# Patient Record
Sex: FEMALE | Race: WHITE | NOT HISPANIC OR LATINO | Employment: FULL TIME | URBAN - METROPOLITAN AREA
[De-identification: names, ages, dates, MRNs, and addresses within clinical notes are randomized per-mention and may not be internally consistent; named-entity substitution may affect disease eponyms.]

---

## 2017-04-03 ENCOUNTER — ALLSCRIPTS OFFICE VISIT (OUTPATIENT)
Dept: OTHER | Facility: OTHER | Age: 47
End: 2017-04-03

## 2017-04-03 DIAGNOSIS — N92.6 IRREGULAR MENSTRUATION: ICD-10-CM

## 2017-04-03 DIAGNOSIS — Z12.31 ENCOUNTER FOR SCREENING MAMMOGRAM FOR MALIGNANT NEOPLASM OF BREAST: ICD-10-CM

## 2017-04-03 PROCEDURE — G0145 SCR C/V CYTO,THINLAYER,RESCR: HCPCS | Performed by: OBSTETRICS & GYNECOLOGY

## 2017-04-04 ENCOUNTER — LAB REQUISITION (OUTPATIENT)
Dept: LAB | Facility: HOSPITAL | Age: 47
End: 2017-04-04
Payer: COMMERCIAL

## 2017-04-04 DIAGNOSIS — Z01.419 ENCOUNTER FOR GYNECOLOGICAL EXAMINATION WITHOUT ABNORMAL FINDING: ICD-10-CM

## 2017-04-10 LAB
LAB AP GYN PRIMARY INTERPRETATION: NORMAL
Lab: NORMAL

## 2017-04-11 ENCOUNTER — HOSPITAL ENCOUNTER (OUTPATIENT)
Dept: ULTRASOUND IMAGING | Facility: MEDICAL CENTER | Age: 47
Discharge: HOME | End: 2017-04-11
Payer: COMMERCIAL

## 2017-04-11 ENCOUNTER — GENERIC CONVERSION - ENCOUNTER (OUTPATIENT)
Dept: OTHER | Facility: OTHER | Age: 47
End: 2017-04-11

## 2017-04-11 DIAGNOSIS — N92.6 IRREGULAR MENSTRUATION: ICD-10-CM

## 2017-04-11 PROCEDURE — 76830 TRANSVAGINAL US NON-OB: CPT

## 2017-04-11 PROCEDURE — 76856 US EXAM PELVIC COMPLETE: CPT

## 2017-04-14 ENCOUNTER — GENERIC CONVERSION - ENCOUNTER (OUTPATIENT)
Dept: OTHER | Facility: OTHER | Age: 47
End: 2017-04-14

## 2017-05-08 ENCOUNTER — HOSPITAL ENCOUNTER (OUTPATIENT)
Dept: MAMMOGRAPHY | Facility: MEDICAL CENTER | Age: 47
Discharge: HOME | End: 2017-05-08
Payer: COMMERCIAL

## 2017-05-08 DIAGNOSIS — Z12.31 ENCOUNTER FOR SCREENING MAMMOGRAM FOR MALIGNANT NEOPLASM OF BREAST: ICD-10-CM

## 2017-05-08 PROCEDURE — G0202 SCR MAMMO BI INCL CAD: HCPCS

## 2017-05-11 ENCOUNTER — GENERIC CONVERSION - ENCOUNTER (OUTPATIENT)
Dept: OTHER | Facility: OTHER | Age: 47
End: 2017-05-11

## 2017-05-11 DIAGNOSIS — R92.8 OTHER ABNORMAL AND INCONCLUSIVE FINDINGS ON DIAGNOSTIC IMAGING OF BREAST: ICD-10-CM

## 2017-05-12 ENCOUNTER — HOSPITAL ENCOUNTER (OUTPATIENT)
Dept: MAMMOGRAPHY | Facility: CLINIC | Age: 47
Discharge: HOME | End: 2017-05-12
Payer: COMMERCIAL

## 2017-05-12 ENCOUNTER — HOSPITAL ENCOUNTER (OUTPATIENT)
Dept: ULTRASOUND IMAGING | Facility: CLINIC | Age: 47
Discharge: HOME | End: 2017-05-12
Payer: COMMERCIAL

## 2017-05-12 ENCOUNTER — ALLSCRIPTS OFFICE VISIT (OUTPATIENT)
Dept: OTHER | Facility: OTHER | Age: 47
End: 2017-05-12

## 2017-05-12 DIAGNOSIS — R92.8 ABNORMAL MAMMOGRAM: ICD-10-CM

## 2017-05-12 DIAGNOSIS — R92.8 OTHER ABNORMAL AND INCONCLUSIVE FINDINGS ON DIAGNOSTIC IMAGING OF BREAST: ICD-10-CM

## 2017-05-12 PROCEDURE — 76642 ULTRASOUND BREAST LIMITED: CPT

## 2017-05-12 PROCEDURE — G0206 DX MAMMO INCL CAD UNI: HCPCS

## 2017-11-28 ENCOUNTER — ALLSCRIPTS OFFICE VISIT (OUTPATIENT)
Dept: OTHER | Facility: OTHER | Age: 47
End: 2017-11-28

## 2017-11-29 NOTE — PROGRESS NOTES
Assessment    1  Recurrent candidiasis of vagina (112 1) (B37 3)    Plan  Recurrent candidiasis of vagina    · Fluconazole 150 MG Oral Tablet; Take 1 tab po q 72 hours x3 doses then q weekuntil finished   Rx By: Phoebe Duty; Dispense: 20 Days ; #:20 Tablet; Refill: 0;Recurrent candidiasis of vagina; NICOLE = N; Verified Transmission to [id number] Last Updated By: System, Lover.ly; 11/28/2017 2:38:09 PM    Discussion/Summary  Discussion Summary:   Discussed options of treating for longer period of time, as she did in the past  Patient amendable to this  We will treat her for a yeast infection with fluconazole every 3 days for 3 doses then followed by weekly for 3 months  Counseling Documentation With Imm: The patient was counseled regarding  total time of encounter was minutes  Chief Complaint  Chief Complaint Free Text Note Form: Pt present s for yeast infection      History of Present Illness  HPI: Patient presents with complaints of a recurrent yeast infections  She states the symptoms started approximately 1 week ago with burning, itching and discharge  She states she used over-the-counter cream for 3 days which help alleviated some of her symptoms but she feels like her infection is still present  She notices her symptoms start 5 days after intercourse  She states it happens every time she has intercourse  She in the past she has been treated with Diflucan sex successfully  If she does not get treatment her symptoms linger and persist until she has Diflucan  She states it is the 3rd time this has happened in 2017  But the symptoms have been had occurring for the past 2 years  This happened multiple years ago where the patient had a take a long course of Diflucan to alleviate her symptoms        Review of Systems   Female ROS: vaginal discharge-- and-- vaginal itching, but-- no pelvic pain,-- no pelvic pressure,-- no vaginal pain,-- no vaginal lump or mass,-- no vaginal odor,-- no vulvar pain,-- no vulvar itching-- and-- no vulvar lump or mass  Focused-Female:  Gastrointestinal: as noted in HPI  Active Problems    1  Abnormal finding on mammography (793 80) (R92 8)   2  Breast pain (611 71) (N64 4)   3  Encounter for routine gynecological examination (V72 31) (Z01 419)   4  Encounter for screening mammogram for malignant neoplasm of breast (V76 12) (Z12 31)   5  Female pelvic pain (625 9) (R10 2)   6  Idiopathic insomnia (307 42) (F51 01)   7  Irregular bleeding (626 4) (N92 6)   8  Screening for HPV (human papillomavirus) (V73 81) (Z11 51)    Past Medical History    1  History of Asthma (493 90) (J45 909)   2  History of Gastroparesis (536 3) (K31 84)   3  History of candidiasis (V12 09) (Z86 19)    Surgical History  1  History of Appendectomy   2  History of Biopsy Breast Open   3  History of Hysteroscopy W/ Insert Of Device To Occlude Fallopian Tubes   4  History of Vaginal Sling Operation For Stress Incontinence    Family History  Paternal Grandmother    1  Family history of Breast Cancer (V16 3)    Social History     · Being A Social Drinker   · Never A Smoker    Current Meds   1  ClonazePAM 1 MG Oral Tablet; TAKE 1 TABLET AT BEDTIME; Therapy: 51Nak3355 to (Last Rx:47Cap2332) Ordered   2  Gabapentin 300 MG Oral Capsule; Take two capsules one hour before bedtime; Therapy: 28GKQ7402 to (Evaluate:21Apr2014)  Requested for: 98Swf6802; Last Rx:46Dwv5559 Ordered   3  Protonix 20 MG Oral Tablet Delayed Release Recorded   4  Zolpidem Tartrate ER 12 5 MG Oral Tablet Extended Release; TAKE 1 TABLET AT BEDTIME; Therapy: 86Liy5298 to (Last Rx:88Uzm7535) Ordered    Allergies  1  Cipro TABS   2  Erythromycin Base TABS   3  Iodine SOLN   4  NuPrep 5% Povidone-Iodine SOLN   5  Penicillins   6   Vicodin TABS  7  IV Dye    Vitals  Vital Signs    Recorded: 98YQQ1697 20:83XV   Systolic 415   Diastolic 78   Weight 581 lb    BMI Calculated 30 21   BSA Calculated 1 85       Physical Exam   Constitutional General appearance: No acute distress, well appearing and well nourished  Pulmonary  Respiratory effort: No increased work of breathing or signs of respiratory distress  Auscultation of lungs: Clear to auscultation  Cardiovascular  Auscultation of heart: Normal rate and rhythm, normal S1 and S2, no murmurs  Peripheral vascular exam: Normal pulses Throughout  Genitourinary  External genitalia: Normal and no lesions appreciated  Vagina: Abnormal   Vagina: moderate,-- white-- and-- cheesy vaginal discharge  Urethra: Normal        Health Management  Encounter for routine gynecological examination   BREAST EXAM; every 1 year; Next Due: 01Ckb7263; Overdue  PELVIC EXAM; every 1 year; Next Due: 05Wws0637;  Overdue    Signatures   Electronically signed by : Ambrose Schlatter, M D ; Nov 28 2017  2:40PM EST                       (Author)

## 2018-01-11 NOTE — RESULT NOTES
Verified Results  * US PELVIS COMPLETE (TRANSABDOMINAL AND TRANSVAGINAL) [id number] 01:32PM John Muhammad Order Number: [id number]    - Patient Instructions: To schedule this appointment, please contact Central Scheduling at [phone]  Test Name Result Flag Reference   US PELVIS COMPLETE (TRANSABDOMINAL AND TRANSVAGINAL) (Report)     PELVIC ULTRASOUND, COMPLETE     INDICATION: Irregular menstruation [N92 6 (ICD-10-CM)] LMP was couple of years ago  COMPARISON: CT 4/8/2010  TECHNIQUE:  Transabdominal pelvic ultrasound was performed in sagittal and transverse planes with a curvilinear transducer  Additional transvaginal imaging was performed to better evaluate the endometrium and ovaries  Imaging included volumetric    sweeps as well as traditional still imaging technique  FINDINGS:     UTERUS:   The uterus is anteverted in position, measuring 6 8 x 2 7 x 5 7 cm     2 discrete intramural hypoechoic nodules are seen, measuring 1 4 x 0 9 x 1 3 cm and 1 2 x 0 8 x 1 4 cm, likely representing fibroids  The cervix shows no suspicious abnormality  ENDOMETRIUM:    Caliber of 8 mm  Homogenous and normal in appearance  OVARIES/ADNEXA:   Right ovary: 4 3 x 2 8 x 3 0 cm  No suspicious right ovarian abnormality  Typical corpus luteum noted  There is also a 2 8 x 1 8 x 1 8 cm cyst with thin septation  Doppler flow within normal limits  Left ovary: 1 2 x 2 1 x 1 5 cm  No suspicious left ovarian abnormality  Doppler flow within normal limits  No suspicious adnexal mass or loculated collections  Trace free fluid is present  IMPRESSION:        1  Intramural fibroids  2  Otherwise simple cyst with single thin septation within the right ovary  Corpus luteum cyst also noted          Workstation performed: [id number]     Signed by:   aMrlen Hankins MD   4/13/17

## 2018-01-11 NOTE — RESULT NOTES
Verified Results  * MAMMO SCREENING BILATERAL W CAD [id number] 01:40PM Khadra Deluna Order Number: [id number]    - Patient Instructions: To schedule this appointment, please contact Central Scheduling at [phone]  Do not wear any perfume, powder, lotion or deodorant on breast or underarm area  Please bring your doctors order, referral (if needed) and insurance information with you on the day of the test  Failure to bring this information may result in this test being rescheduled  Arrive 15 minutes prior to your appointment time to register  On the day of your test, please bring any prior mammogram or breast studies with you that were not performed at a [facility]  Failure to bring prior exams may result in your test needing to be rescheduled  Test Name Result Flag Reference   MAMMO SCREENING BILATERAL W CAD (Report)     Patient History:   Patient is postmenopausal and had first child at age 29  Family history of breast cancer under age 48 in paternal    grandmother  Benign breast guidance of the right breast, October 26, 2012  Pathology Report of both breasts, October 26, 2012  Benign    excisional biopsy of the right breast    No Hormone Replacement Therapy   Patient has never smoked  Patient's BMI is 29 5  Reason for exam: screening, asymptomatic  Screening     Mammo Screening Bilateral W CAD: May 8, 2017 - Check In #:    [de-identified]   Bilateral CC and MLO view(s) were taken  Technologist: Petrona North, RT(R)(M)   Prior study comparison: March 21, 2016, mammo screening bilateral   W CAD performed at [place] [address]  January 22, 2015, bilateral digital screening mammogram performed   at [place] [address]  December 23, 2013, bilateral digital screening mammogram performed at [place] [address]   October 26, 2012, right    breast unilateral diagnostic mammogram, performed at [place] [address] [address]  October 26, 2012, bilateral WB digitl    bilat charles, performed at [address] [place] [address]  October 20, 2011, bilateral digital screening mammogram performed   at [facility]  There are scattered fibroglandular densities  No dominant soft    tissue mass, architectural distortion or suspicious    calcifications are noted in either breast  The skin and nipple    contours are within normal limits  Density behind the right nipple towards the 6 o'clock position    seems slightly more prominent than on previous exam  Recommend    spot compression imaging  Ultrasound may also be warranted  Needs additional imaging  ACR BI-RADSï¾® Assessments: BiRad:0 - Incomplete: needs additional    imaging evaluation - Right     Recommendation:   Further imaging of the right breast    A breast health care nurse from our facility will be contacting    the patient regarding the need for additional imaging  Analyzed by CAD     8-10% of cancers will be missed on mammography  Management of a    palpable abnormality must be based on clinical grounds  Patients   will be notified of their results via letter from our facility  Accredited by [employer] of Radiology and FDA  Transcription Location: [facility]: [id number]     Risk Value(s):   Tyrer-Cuzick 10 Year: 3 400%, Tyrer-Cuzick Lifetime: 17 900%,    Myriad Table: 2 6%, SAMPSON 5 Year: 3 2%, NCI Lifetime: 21 2%   Signed by:   Arti Hay MD   5/8/17       Plan  Abnormal finding on mammography    · *US BREAST RIGHT LIMITED (DIAGNOSTIC);  Status:Hold For - Scheduling; Requested  for:78Umx7090;    · MAMMO DIAGNOSTIC RIGHT W CAD; Status:Hold For - Scheduling; Requested  for:12Tvr7340;

## 2018-01-11 NOTE — RESULT NOTES
Verified Results  (LC) PapIG, HPV, rfx 16/18 52Yho9507 12:00AM Nasrin Paige   No  of containers  Rosella Goldmann 01 CYTYC Thin Prep Vial     Test Name Result Flag Reference   DIAGNOSIS: Comment     NEGATIVE FOR INTRAEPITHELIAL LESION AND MALIGNANCY  THE CYTOLOGY PROCESSING WAS PERFORMED AT THE [facility] LOCATED AT  [facility], [address] [place] [address], [address] [place] [phone]  Specimen adequacy: Comment     Satisfactory for evaluation  Endocervical and/or squamous metaplastic  cells (endocervical component) are present  Clinician provided ICD10: Comment     Z11 51  Z01 419   Performed by: Lawrence French Cytotechnologist (ASCP)     Rosella Goldmann Note: Comment     The Pap smear is a screening test designed to aid in the detection of  premalignant and malignant conditions of the uterine cervix  It is not a  diagnostic procedure and should not be used as the sole means of detecting  cervical cancer  Both false-positive and false-negative reports do occur  Test Methodology: Comment     This liquid based ThinPrep(R) pap test was screened with the  use of an image guided system  HPV, high-risk Negative  Negative   This high-risk HPV test detects thirteen high-risk types  (16/18/31/33/35/39/45/51/52/56/58/59/68) without differentiation

## 2018-01-13 NOTE — RESULT NOTES
Verified Results  (1) THIN PREP PAP WITH IMAGING 03Apr2017 12:00AM Bautista Dec     Test Name Result Flag Reference   LAB AP CASE REPORT (Report)     Gynecologic Cytology Report            Case: [id number]                  Authorizing Provider: Fernando Simpson MD     Collected:      04/03/2017           First Screen:     SANDY Maldonado Received:      04/04/2017 1448        Rescreen:       SANDY Key                             Specimen:  LIQUID-BASED PAP, SCREENING, Endocervical   LAB AP GYN PRIMARY INTERPRETATION      Negative for intraepithelial lesion or malignancy  Electronically signed by SANDY Key on 4/10/2017 at 11:10 AM   LAB AP GYN SPECIMEN ADEQUACY      Satisfactory for evaluation  Endocervical/transformation zone component present  LAB AP GYN ADDITIONAL INFORMATION (Report)     Odimax's FDA approved ,  and ThinPrep Imaging System are   utilized with strict adherence to the 's instruction manual to   prepare gynecologic and non-gynecologic cytology specimens for the   production of ThinPrep slides as well as for gynecologic ThinPrep imaging  These processes have been validated by our laboratory and/or by the     The Pap test is not a diagnostic procedure and should not be used as the   sole means to detect cervical cancer  It is only a screening procedure to   aid in the detection of cervical cancer and its precursors  Both   false-negative and false-positive results have been experienced  Your   patient's test result should be interpreted in this context together with   the history and clinical findings

## 2018-01-14 VITALS
SYSTOLIC BLOOD PRESSURE: 124 MMHG | DIASTOLIC BLOOD PRESSURE: 74 MMHG | BODY MASS INDEX: 29.56 KG/M2 | WEIGHT: 172.19 LBS

## 2018-01-14 VITALS — WEIGHT: 176 LBS | SYSTOLIC BLOOD PRESSURE: 116 MMHG | BODY MASS INDEX: 30.21 KG/M2 | DIASTOLIC BLOOD PRESSURE: 78 MMHG

## 2018-01-14 VITALS — BODY MASS INDEX: 30.04 KG/M2 | WEIGHT: 175 LBS | DIASTOLIC BLOOD PRESSURE: 80 MMHG | SYSTOLIC BLOOD PRESSURE: 126 MMHG

## 2018-09-07 DIAGNOSIS — Z12.39 SCREENING FOR MALIGNANT NEOPLASM OF BREAST: Primary | ICD-10-CM

## 2018-10-01 ENCOUNTER — HOSPITAL ENCOUNTER (OUTPATIENT)
Dept: MAMMOGRAPHY | Facility: MEDICAL CENTER | Age: 48
Discharge: HOME | End: 2018-10-01
Payer: COMMERCIAL

## 2018-10-01 DIAGNOSIS — Z12.39 SCREENING FOR MALIGNANT NEOPLASM OF BREAST: ICD-10-CM

## 2018-10-01 PROCEDURE — 77067 SCR MAMMO BI INCL CAD: CPT

## 2018-10-01 PROCEDURE — 77063 BREAST TOMOSYNTHESIS BI: CPT

## 2018-10-02 ENCOUNTER — ANNUAL EXAM (OUTPATIENT)
Dept: OBGYN CLINIC | Facility: MEDICAL CENTER | Age: 48
End: 2018-10-02
Payer: COMMERCIAL

## 2018-10-02 VITALS — DIASTOLIC BLOOD PRESSURE: 76 MMHG | WEIGHT: 169 LBS | SYSTOLIC BLOOD PRESSURE: 124 MMHG | BODY MASS INDEX: 29.01 KG/M2

## 2018-10-02 DIAGNOSIS — Z12.39 SCREENING FOR MALIGNANT NEOPLASM OF BREAST: ICD-10-CM

## 2018-10-02 DIAGNOSIS — Z01.419 ENCOUNTER FOR WELL WOMAN EXAM WITH ROUTINE GYNECOLOGICAL EXAM: Primary | ICD-10-CM

## 2018-10-02 PROCEDURE — 99396 PREV VISIT EST AGE 40-64: CPT | Performed by: OBSTETRICS & GYNECOLOGY

## 2018-10-02 RX ORDER — ZOLPIDEM TARTRATE 12.5 MG/1
TABLET, FILM COATED, EXTENDED RELEASE ORAL
COMMUNITY
Start: 2018-07-21

## 2018-10-02 RX ORDER — CLONAZEPAM 2 MG/1
TABLET ORAL
Refills: 0 | COMMUNITY
Start: 2018-07-05

## 2018-10-02 RX ORDER — PANTOPRAZOLE SODIUM 40 MG/1
TABLET, DELAYED RELEASE ORAL
COMMUNITY
Start: 2018-06-11

## 2018-10-02 RX ORDER — GABAPENTIN 300 MG/1
CAPSULE ORAL
COMMUNITY
Start: 2018-06-11

## 2018-10-02 NOTE — PROGRESS NOTES
ASSESSMENT & PLAN: Cathryn Murry is a 53 y o  No obstetric history on file  with normal gynecologic exam     1   Routine well woman exam done today  2  Pap and HPV:  The patient's last pap and hpv was 2016, pap 2017  It was normal     Pap and cotesting was done today  Current ASCCP Guidelines reviewed  Per patient's request  3  Mammogram ordered  4  The following were reviewed in today's visit: breast self exam and mammography screening ordered      CC:  Annual Gynecologic Examination    HPI: Cathryn Murry is a 53 y o  No obstetric history on file  who presents for annual gynecologic examination  She has the following concerns:  Reports urge incontinence, aware she waits too long between voids; still bleeding monthly with cramps, light period    Health Maintenance:    She wears her seatbelt routinely  She does perform regular monthly self breast exams  She feels safe at home  There is no problem list on file for this patient  History reviewed  No pertinent past medical history  History reviewed  No pertinent surgical history  Past OB/Gyn History:  OB History     No data available           Pt does not have menstrual issues     History of sexually transmitted infection: No   History of abnormal pap smears: No      Patient is currently sexually active  heterosexual  The current method of family planning is tubal ligation  History reviewed  No pertinent family history  Social History:  Social History     Social History    Marital status:      Spouse name: N/A    Number of children: N/A    Years of education: N/A     Occupational History    Not on file       Social History Main Topics    Smoking status: Never Smoker    Smokeless tobacco: Never Used    Alcohol use No    Drug use: No    Sexual activity: Yes     Partners: Male     Other Topics Concern    Not on file     Social History Narrative    No narrative on file       Allergies   Allergen Reactions    Hydrocodone      headache    Ciprofloxacin     Codeine     Erythromycin     Iodine Other (See Comments)     dye-rash    Iv Dye  [Iodinated Diagnostic Agents]     Povidone Iodine        Current Outpatient Prescriptions:     clonazePAM (KlonoPIN) 2 mg tablet, TAKE 1 TABLET (2 MG TOTAL) BY MOUTH NIGHTLY , Disp: , Rfl: 0    gabapentin (NEURONTIN) 300 mg capsule, TAKE 2 CAPSULES (600 MG TOTAL) BY MOUTH NIGHTLY , Disp: , Rfl:     pantoprazole (PROTONIX) 40 mg tablet, TAKE 1 TABLET (40 MG TOTAL) BY MOUTH 2 (TWO) TIMES A DAY , Disp: , Rfl:     zolpidem (AMBIEN CR) 12 5 MG CR tablet, TAKE 1 TABLET BY MOUTH AT BEDTIME, Disp: , Rfl:     Review of Systems:    Review of Systems  Constitutional :no fever, feels well, no tiredness, no recent weight gain or loss  ENT: no ear ache, no loss of hearing, no nosebleeds or nasal discharge, no sore throat or hoarseness  Cardiovascular: no complaints of slow or fast heart beat, no chest pain, no palpitations, no leg claudication or lower extremity edema  Respiratory: no complaints of shortness of shortness of breath, no ELLER  Breasts:no complaints of breast pain, breast lump, or nipple discharge  Gastrointestinal: no complaints of abdominal pain, constipation, nausea, vomiting, or diarrhea or bloody stools  Genitourinary : no complaints of dysuria, incontinence, pelvic pain, no dysmenorrhea, vaginal discharge or abnormal vaginal bleeding and as noted in HPI  Musculoskeletal: no complaints of arthralgia, no myalgia, no joint swelling or stiffness, no limb pain or swelling    Integumentary: no complaints of skin rash or lesion, itching or dry skin  Neurological: no complaints of headache, no confusion, no numbness or tingling, no dizziness or fainting    Objective      /76   Wt 76 7 kg (169 lb)   LMP 10/02/2018   BMI 29 01 kg/m²     General:   appears stated age, cooperative, alert normal mood and affect   Neck: normal, supple,trachea midline, no masses   Heart: regular rate and rhythm, S1, S2 normal, no murmur, click, rub or gallop   Lungs: clear to auscultation bilaterally   Breasts: normal appearance, no masses or tenderness, Inspection negative, No nipple retraction or dimpling, No nipple discharge or bleeding, No axillary or supraclavicular adenopathy, Normal to palpation without dominant masses   Abdomen: soft, non-tender, without masses or organomegaly   Vulva: normal female genitalia, Bartholin's, Urethra, [place] normal, no lesions, normal female hair distribution   Vagina: normal vagina, no discharge, exudate, lesion, or erythema   Urethra: normal   Cervix: Normal, no discharge  PAP done  Uterus: normal size, contour, position, consistency, mobility, non-tender   Adnexa: normal adnexa   Lymphatic palpation of lymph nodes in neck, axilla, groin and/or other locations: no lymphadenopathy or masses noted   Skin normal skin turgor and no rashes     Psychiatric orientation to person, place, and time: normal  mood and affect: normal

## 2018-10-05 LAB
CYTOLOGIST CVX/VAG CYTO: NORMAL
DX ICD CODE: NORMAL
OTHER STN SPEC: NORMAL
OTHER STN SPEC: NORMAL
PATH REPORT.FINAL DX SPEC: NORMAL
SL AMB NOTE:: NORMAL
SL AMB SPECIMEN ADEQUACY: NORMAL
SL AMB TEST METHODOLOGY: NORMAL

## 2018-11-26 DIAGNOSIS — B37.3 VAGINAL YEAST INFECTION: Primary | ICD-10-CM

## 2018-11-26 RX ORDER — FLUCONAZOLE 150 MG/1
150 TABLET ORAL ONCE
Qty: 2 TABLET | Refills: 1 | Status: SHIPPED | OUTPATIENT
Start: 2018-11-26 | End: 2019-10-23 | Stop reason: SDUPTHER

## 2019-09-09 ENCOUNTER — TRANSCRIBE ORDERS (OUTPATIENT)
Dept: ADMINISTRATIVE | Facility: HOSPITAL | Age: 49
End: 2019-09-09

## 2019-09-09 DIAGNOSIS — Z12.39 BREAST SCREENING, UNSPECIFIED: Primary | ICD-10-CM

## 2019-09-09 DIAGNOSIS — Z12.39 BREAST SCREENING: ICD-10-CM

## 2019-10-23 DIAGNOSIS — B37.3 VAGINAL YEAST INFECTION: ICD-10-CM

## 2019-10-23 RX ORDER — FLUCONAZOLE 150 MG/1
150 TABLET ORAL ONCE
Qty: 2 TABLET | Refills: 1 | Status: SHIPPED | OUTPATIENT
Start: 2019-10-23 | End: 2019-10-23

## 2019-10-28 ENCOUNTER — HOSPITAL ENCOUNTER (OUTPATIENT)
Dept: MAMMOGRAPHY | Facility: MEDICAL CENTER | Age: 49
Discharge: HOME | End: 2019-10-28
Payer: COMMERCIAL

## 2019-10-28 VITALS — BODY MASS INDEX: 28.85 KG/M2 | HEIGHT: 64 IN | WEIGHT: 169 LBS

## 2019-10-28 DIAGNOSIS — Z12.39 BREAST SCREENING: ICD-10-CM

## 2019-10-28 PROCEDURE — 77063 BREAST TOMOSYNTHESIS BI: CPT

## 2019-10-28 PROCEDURE — 77067 SCR MAMMO BI INCL CAD: CPT

## 2019-12-15 DIAGNOSIS — B37.3 VAGINAL YEAST INFECTION: ICD-10-CM

## 2019-12-16 RX ORDER — FLUCONAZOLE 150 MG/1
150 TABLET ORAL ONCE
Qty: 2 TABLET | Refills: 1 | OUTPATIENT
Start: 2019-12-16 | End: 2019-12-16

## 2020-02-09 DIAGNOSIS — B37.3 VAGINAL YEAST INFECTION: ICD-10-CM

## 2020-02-10 RX ORDER — FLUCONAZOLE 150 MG/1
150 TABLET ORAL ONCE
Qty: 2 TABLET | Refills: 1 | OUTPATIENT
Start: 2020-02-10 | End: 2020-02-10

## 2020-02-13 ENCOUNTER — OFFICE VISIT (OUTPATIENT)
Dept: OBGYN CLINIC | Facility: CLINIC | Age: 50
End: 2020-02-13
Payer: COMMERCIAL

## 2020-02-13 VITALS — DIASTOLIC BLOOD PRESSURE: 100 MMHG | SYSTOLIC BLOOD PRESSURE: 156 MMHG | BODY MASS INDEX: 30.55 KG/M2 | WEIGHT: 178 LBS

## 2020-02-13 DIAGNOSIS — N90.89 VULVAR IRRITATION: Primary | ICD-10-CM

## 2020-02-13 DIAGNOSIS — B37.9 CANDIDA INFECTION: ICD-10-CM

## 2020-02-13 PROCEDURE — 99213 OFFICE O/P EST LOW 20 MIN: CPT | Performed by: OBSTETRICS & GYNECOLOGY

## 2020-02-13 RX ORDER — FLUCONAZOLE 150 MG/1
150 TABLET ORAL ONCE
Qty: 1 TABLET | Refills: 0 | Status: SHIPPED | OUTPATIENT
Start: 2020-02-13 | End: 2020-02-13

## 2020-02-13 RX ORDER — PAROXETINE HYDROCHLORIDE 20 MG/1
TABLET, FILM COATED ORAL
COMMUNITY
Start: 2019-11-18

## 2020-02-13 RX ORDER — PHENTERMINE HYDROCHLORIDE 37.5 MG/1
37.5 CAPSULE ORAL
COMMUNITY
Start: 2020-01-31 | End: 2020-03-01

## 2020-02-13 NOTE — PROGRESS NOTES
Chalino Downing was seen today for vaginal itching  Diagnoses and all orders for this visit:    Vulvar irritation    Candida infection  -     fluconazole (DIFLUCAN) 150 mg tablet; Take 1 tablet (150 mg total) by mouth once for 1 dose         Plan:  Patient reassured KOH and wet mount negative today  Vulvar irritation: supportive measures reviewed, encouraged lotrimin   Diflucan refill per patient request secondary to frequent yeast infection    Aarti Addison is a 51 y o  female here for a problem visit  Patient is complaining of irritation of her vulva and opening of vagina  Has tried the last week 3 diflucan and monistat with most recent diflucan being yesterday  States her irritation is mostly outside her vagina  denies any changes in habits   There is no problem list on file for this patient  Gynecologic History  Patient's last menstrual period was 10/02/2018        Past Medical History:   Diagnosis Date    Hx of skin cancer, basal cell      Past Surgical History:   Procedure Laterality Date    BREAST BIOPSY Right 10/26/2012    benign     Family History   Problem Relation Age of Onset    No Known Problems Mother     No Known Problems Father     No Known Problems Maternal Grandmother     No Known Problems Maternal Grandfather     Breast cancer Paternal Grandmother         age unknown    No Known Problems Paternal Grandfather     No Known Problems Maternal Aunt     Skin cancer Maternal Aunt         age unknown     Social History     Socioeconomic History    Marital status:      Spouse name: Not on file    Number of children: Not on file    Years of education: Not on file    Highest education level: Not on file   Occupational History    Not on file   Social Needs    Financial resource strain: Not on file    Food insecurity:     Worry: Not on file     Inability: Not on file    Transportation needs:     Medical: Not on file     Non-medical: Not on file   Tobacco Use    Smoking status: Never Smoker    Smokeless tobacco: Never Used   Substance and Sexual Activity    Alcohol use: No    Drug use: No    Sexual activity: Yes     Partners: Male   Lifestyle    Physical activity:     Days per week: Not on file     Minutes per session: Not on file    Stress: Not on file   Relationships    Social connections:     Talks on phone: Not on file     Gets together: Not on file     Attends [employer] service: Not on file     Active member of club or organization: Not on file     Attends meetings of clubs or organizations: Not on file     Relationship status: Not on file    Intimate partner violence:     Fear of current or ex partner: Not on file     Emotionally abused: Not on file     Physically abused: Not on file     Forced sexual activity: Not on file   Other Topics Concern    Not on file   Social History Narrative    Not on file     Allergies   Allergen Reactions    Hydrocodone      headache    Ciprofloxacin     Codeine     Erythromycin     Iodine Other (See Comments)     dye-rash    Iv Dye  [Iodinated Diagnostic Agents]     Povidone Iodine        Current Outpatient Medications:     clonazePAM (KlonoPIN) 2 mg tablet, TAKE 1 TABLET (2 MG TOTAL) BY MOUTH NIGHTLY , Disp: , Rfl: 0    gabapentin (NEURONTIN) 300 mg capsule, TAKE 2 CAPSULES (600 MG TOTAL) BY MOUTH NIGHTLY , Disp: , Rfl:     pantoprazole (PROTONIX) 40 mg tablet, TAKE 1 TABLET (40 MG TOTAL) BY MOUTH 2 (TWO) TIMES A DAY , Disp: , Rfl:     PARoxetine (PAXIL) 20 mg tablet, TAKE 1 TABLET BY MOUTH EVERY DAY, Disp: , Rfl:     phentermine 37 5 MG capsule, Take 37 5 mg by mouth, Disp: , Rfl:     zolpidem (AMBIEN CR) 12 5 MG CR tablet, TAKE 1 TABLET BY MOUTH AT BEDTIME, Disp: , Rfl:     fluconazole (DIFLUCAN) 150 mg tablet, Take 1 tablet (150 mg total) by mouth once for 1 dose, Disp: 1 tablet, Rfl: 0    Review of Systems  Constitutional :no fever, feels well, no tiredness, no recent weight gain or loss  ENT: no ear ache, no loss of hearing, no nosebleeds or nasal discharge, no sore throat or hoarseness  Cardiovascular: no complaints of slow or fast heart beat, no chest pain, no palpitations, no leg claudication or lower extremity edema  Respiratory: no complaints of shortness of shortness of breath, no ELLER  Breasts:no complaints of breast pain, breast lump, or nipple discharge  Gastrointestinal: no complaints of abdominal pain, constipation, nausea, vomiting, or diarrhea or bloody stools  Genitourinary :as noted in HPI  Musculoskeletal: no complaints of arthralgia, no myalgia, no joint swelling or stiffness, no limb pain or swelling  Integumentary: no complaints of skin rash or lesion, itching or dry skin  Neurological: no complaints of headache, no confusion, no numbness or tingling, no dizziness or fainting     Objective     /100   Wt 80 7 kg (178 lb)   LMP 10/02/2018   BMI 30 55 kg/m²     General:   appears stated age, cooperative, alert normal mood and affect   Abdomen: soft, non-tender, without masses or organomegaly   Vulva: Irritated , no ulcers or lesions seen    Vagina: normal vagina, no discharge, exudate, lesion, or erythema   Urethra: normal   Cervix: Normal, no discharge  Nontender  Uterus: normal size, contour, position, consistency, mobility, non-tender   Adnexa: no mass, fullness, tenderness   Lymphatic palpation of lymph nodes in neck, axilla, groin and/or other locations: no lymphadenopathy or masses noted   Skin normal skin turgor and no rashes     Psychiatric orientation to person, place, and time: normal  mood and affect: normal   KOH and wet mount negative

## 2020-03-11 ENCOUNTER — ANNUAL EXAM (OUTPATIENT)
Dept: OBGYN CLINIC | Facility: CLINIC | Age: 50
End: 2020-03-11
Payer: COMMERCIAL

## 2020-03-11 VITALS — SYSTOLIC BLOOD PRESSURE: 140 MMHG | DIASTOLIC BLOOD PRESSURE: 80 MMHG | WEIGHT: 179.2 LBS | BODY MASS INDEX: 30.76 KG/M2

## 2020-03-11 DIAGNOSIS — Z01.419 ENCOUNTER FOR WELL WOMAN EXAM WITH ROUTINE GYNECOLOGICAL EXAM: Primary | ICD-10-CM

## 2020-03-11 DIAGNOSIS — Z12.31 ENCOUNTER FOR SCREENING MAMMOGRAM FOR MALIGNANT NEOPLASM OF BREAST: ICD-10-CM

## 2020-03-11 PROCEDURE — G0145 SCR C/V CYTO,THINLAYER,RESCR: HCPCS | Performed by: OBSTETRICS & GYNECOLOGY

## 2020-03-11 PROCEDURE — 99396 PREV VISIT EST AGE 40-64: CPT | Performed by: OBSTETRICS & GYNECOLOGY

## 2020-03-11 NOTE — PROGRESS NOTES
ASSESSMENT & PLAN: Stacey Wetzel is a 51 y o  [id number] with normal gynecologic exam     1   Routine well woman exam done today  2  Pap and HPV:  The patient's last pap and hpv was , pap 2017  It was normal   Pap with reflex was done today  Current ASCCP Guidelines reviewed  Per patient's request  3  Mammogram ordered  4  The following were reviewed in today's visit: breast self exam and mammography screening ordered  5  Easy bruising - recommend Hem/Onc consult, considering  Will let us know    CC: Annual Gynecologic Examination    HPI: Stacey Wetzel is a 51 y o  [id number] who presents for annual gynecologic examination  She has the following concerns:  No GYN complaints; states she gets easy bruising  Thinks it could be due to scratching her skin; denies trauma     Health Maintenance:    She wears her seatbelt routinely  She does perform regular monthly self breast exams  She feels safe at home  There is no problem list on file for this patient  Past Medical History:   Diagnosis Date    Hx of skin cancer, basal cell        Past Surgical History:   Procedure Laterality Date    BREAST BIOPSY Right 10/26/2012    benign       Past OB/Gyn History:  OB History        2    Para   1    Term   0       1    AB   1    Living   1       SAB   1    TAB        Ectopic        Multiple        Live Births   1                  Pt does not have menstrual issues     History of sexually transmitted infection: No   History of abnormal pap smears: No      Patient is currently sexually active  heterosexual  The current method of family planning is tubal ligation      Family History   Problem Relation Age of Onset    No Known Problems Mother     No Known Problems Father     No Known Problems Maternal Grandmother     No Known Problems Maternal Grandfather     Breast cancer Paternal Grandmother         age unknown    No Known Problems Paternal Grandfather     No Known Problems Maternal Aunt  Skin cancer Maternal Aunt         age unknown       Social History:  Social History     Socioeconomic History    Marital status:      Spouse name: Not on file    Number of children: Not on file    Years of education: Not on file    Highest education level: Not on file   Occupational History    Not on file   Social Needs    Financial resource strain: Not on file    Food insecurity:     Worry: Not on file     Inability: Not on file    Transportation needs:     Medical: Not on file     Non-medical: Not on file   Tobacco Use    Smoking status: Never Smoker    Smokeless tobacco: Never Used   Substance and Sexual Activity    Alcohol use: Yes     Frequency: 2-4 times a month    Drug use: No    Sexual activity: Yes     Partners: Male     Birth control/protection: Post-menopausal   Lifestyle    Physical activity:     Days per week: Not on file     Minutes per session: Not on file    Stress: Not on file   Relationships    Social connections:     Talks on phone: Not on file     Gets together: Not on file     Attends [employer] service: Not on file     Active member of club or organization: Not on file     Attends meetings of clubs or organizations: Not on file     Relationship status: Not on file    Intimate partner violence:     Fear of current or ex partner: Not on file     Emotionally abused: Not on file     Physically abused: Not on file     Forced sexual activity: Not on file   Other Topics Concern    Not on file   Social History Narrative    Not on file       Allergies   Allergen Reactions    Hydrocodone      headache    Ciprofloxacin     Codeine     Erythromycin     Iodine Other (See Comments)     dye-rash    Iv Dye  [Iodinated Diagnostic Agents]     Povidone Iodine        Current Outpatient Medications:     clonazePAM (KlonoPIN) 2 mg tablet, TAKE 1 TABLET (2 MG TOTAL) BY MOUTH NIGHTLY , Disp: , Rfl: 0    gabapentin (NEURONTIN) 300 mg capsule, TAKE 2 CAPSULES (600 MG TOTAL) BY MOUTH NIGHTLY , Disp: , Rfl:     pantoprazole (PROTONIX) 40 mg tablet, TAKE 1 TABLET (40 MG TOTAL) BY MOUTH 2 (TWO) TIMES A DAY , Disp: , Rfl:     PARoxetine (PAXIL) 20 mg tablet, TAKE 1 TABLET BY MOUTH EVERY DAY, Disp: , Rfl:     zolpidem (AMBIEN CR) 12 5 MG CR tablet, TAKE 1 TABLET BY MOUTH AT BEDTIME, Disp: , Rfl:     phentermine 37 5 MG capsule, Take 37 5 mg by mouth, Disp: , Rfl:     Review of Systems:    Review of Systems  Constitutional :no fever, feels well, no tiredness, no recent weight gain or loss  ENT: no ear ache, no loss of hearing, no nosebleeds or nasal discharge, no sore throat or hoarseness  Cardiovascular: no complaints of slow or fast heart beat, no chest pain, no palpitations, no leg claudication or lower extremity edema  Respiratory: no complaints of shortness of shortness of breath, no ELLER  Breasts:no complaints of breast pain, breast lump, or nipple discharge  Gastrointestinal: no complaints of abdominal pain, constipation, nausea, vomiting, or diarrhea or bloody stools  Genitourinary : no complaints of dysuria, incontinence, pelvic pain, no dysmenorrhea, vaginal discharge or abnormal vaginal bleeding and as noted in HPI  Musculoskeletal: no complaints of arthralgia, no myalgia, no joint swelling or stiffness, no limb pain or swelling    Integumentary: no complaints of skin rash or lesion, itching or dry skin  Neurological: no complaints of headache, no confusion, no numbness or tingling, no dizziness or fainting    Objective      /80   Wt 81 3 kg (179 lb 3 2 oz)   LMP 10/02/2018   BMI 30 76 kg/m²     General:   appears stated age, cooperative, alert normal mood and affect   Neck: normal, supple,trachea midline, no masses   Heart: regular rate and rhythm, S1, S2 normal, no murmur, click, rub or gallop   Lungs: clear to auscultation bilaterally   Breasts: normal appearance, no masses or tenderness, Inspection negative, No nipple retraction or dimpling, No nipple discharge or bleeding, No axillary or supraclavicular adenopathy, Normal to palpation without dominant masses   Abdomen: soft, non-tender, without masses or organomegaly   Vulva: normal female genitalia, Bartholin's, Urethra, [place] normal, no lesions, normal female hair distribution   Vagina: normal vagina, no discharge, exudate, lesion, or erythema   Urethra: normal   Cervix: Normal, no discharge  PAP done     Uterus: normal size, contour, position, consistency, mobility, non-tender   Adnexa: normal adnexa   Lymphatic palpation of lymph nodes in neck, axilla, groin and/or other locations: no lymphadenopathy or masses noted   Skin normal skin turgor and no rashes bruises on thighs bilaterally, tender   Psychiatric orientation to person, place, and time: normal  mood and affect: normal

## 2020-03-18 DIAGNOSIS — B37.3 VAGINAL YEAST INFECTION: Primary | ICD-10-CM

## 2020-03-18 LAB
LAB AP GYN PRIMARY INTERPRETATION: NORMAL
Lab: NORMAL
PATH INTERP SPEC-IMP: NORMAL

## 2020-03-18 RX ORDER — FLUCONAZOLE 150 MG/1
150 TABLET ORAL ONCE
Qty: 1 TABLET | Refills: 0 | Status: SHIPPED | OUTPATIENT
Start: 2020-03-18 | End: 2020-03-18

## 2020-03-18 NOTE — TELEPHONE ENCOUNTER
----- Message from Rogerio Emery MD sent at 3/18/2020  2:23 PM EDT -----  Pap normal - please send letter  Fungal infection noted   Treat with Fluconazole 150 mg po x 1; no refills

## 2021-01-18 ENCOUNTER — HOSPITAL ENCOUNTER (OUTPATIENT)
Dept: MAMMOGRAPHY | Facility: MEDICAL CENTER | Age: 51
Discharge: HOME | End: 2021-01-18
Payer: COMMERCIAL

## 2021-01-18 VITALS — BODY MASS INDEX: 29.88 KG/M2 | HEIGHT: 64 IN | WEIGHT: 175 LBS

## 2021-01-18 DIAGNOSIS — Z12.31 ENCOUNTER FOR SCREENING MAMMOGRAM FOR MALIGNANT NEOPLASM OF BREAST: ICD-10-CM

## 2021-01-18 PROCEDURE — 77063 BREAST TOMOSYNTHESIS BI: CPT

## 2021-01-18 PROCEDURE — 77067 SCR MAMMO BI INCL CAD: CPT

## 2021-03-16 ENCOUNTER — ANNUAL EXAM (OUTPATIENT)
Dept: OBGYN CLINIC | Facility: CLINIC | Age: 51
End: 2021-03-16
Payer: COMMERCIAL

## 2021-03-16 VITALS — SYSTOLIC BLOOD PRESSURE: 120 MMHG | DIASTOLIC BLOOD PRESSURE: 70 MMHG | BODY MASS INDEX: 29.97 KG/M2 | WEIGHT: 174.6 LBS

## 2021-03-16 DIAGNOSIS — Z12.31 ENCOUNTER FOR SCREENING MAMMOGRAM FOR MALIGNANT NEOPLASM OF BREAST: ICD-10-CM

## 2021-03-16 DIAGNOSIS — Z01.419 ENCOUNTER FOR WELL WOMAN EXAM WITH ROUTINE GYNECOLOGICAL EXAM: Primary | ICD-10-CM

## 2021-03-16 PROCEDURE — 87624 HPV HI-RISK TYP POOLED RSLT: CPT | Performed by: OBSTETRICS & GYNECOLOGY

## 2021-03-16 PROCEDURE — S0612 ANNUAL GYNECOLOGICAL EXAMINA: HCPCS | Performed by: OBSTETRICS & GYNECOLOGY

## 2021-03-16 PROCEDURE — G0145 SCR C/V CYTO,THINLAYER,RESCR: HCPCS | Performed by: OBSTETRICS & GYNECOLOGY

## 2021-03-16 RX ORDER — BUPROPION HYDROCHLORIDE 150 MG/1
150 TABLET, EXTENDED RELEASE ORAL 2 TIMES DAILY
COMMUNITY
Start: 2020-12-17

## 2021-03-16 NOTE — PROGRESS NOTES
ASSESSMENT & PLAN: Cristian Pantoja is a 53 y o  [id number] with normal gynecologic exam     1   Routine well woman exam done today  2  Pap and HPV:  The patient's last pap and hpv was   It was normal   Pap with HPV was done today  Current ASCCP Guidelines reviewed  Annual paps per patient's request  3  Mammogram ordered  4  The following were reviewed in today's visit: breast self exam and mammography screening ordered    CC:  Annual Gynecologic Examination    HPI: Cristian Pantoja is a 53 y o  [id number] who presents for annual gynecologic examination  She has the following concerns:  No GYN complaints; doing well    Health Maintenance:    She wears her seatbelt routinely  She does perform regular monthly self breast exams  She feels safe at home  There is no problem list on file for this patient  Past Medical History:   Diagnosis Date    Hx of skin cancer, basal cell        Past Surgical History:   Procedure Laterality Date    BREAST BIOPSY Right 10/26/2012    benign       Past OB/Gyn History:  OB History        2    Para   1    Term   0       1    AB   1    Living   1       SAB   1    TAB        Ectopic        Multiple        Live Births   1                  Pt does not have menstrual issues     History of sexually transmitted infection: No   History of abnormal pap smears: No      Patient is currently sexually active  heterosexual  The current method of family planning is tubal ligation      Family History   Problem Relation Age of Onset    No Known Problems Mother     No Known Problems Father     No Known Problems Maternal Grandmother     No Known Problems Maternal Grandfather     Breast cancer Paternal Grandmother         age unknown    No Known Problems Paternal Grandfather     No Known Problems Maternal Aunt     Skin cancer Maternal Aunt         age unknown       Social History:  Social History     Socioeconomic History    Marital status:      Spouse name: Not on file    Number of children: Not on file    Years of education: Not on file    Highest education level: Not on file   Occupational History    Not on file   Social Needs    Financial resource strain: Not on file    Food insecurity     Worry: Not on file     Inability: Not on file    Transportation needs     Medical: Not on file     Non-medical: Not on file   Tobacco Use    Smoking status: Never Smoker    Smokeless tobacco: Never Used   Substance and Sexual Activity    Alcohol use: Yes     Frequency: 2-4 times a month    Drug use: No    Sexual activity: Yes     Partners: Male     Birth control/protection: Post-menopausal   Lifestyle    Physical activity     Days per week: Not on file     Minutes per session: Not on file    Stress: Not on file   Relationships    Social connections     Talks on phone: Not on file     Gets together: Not on file     Attends Restorationist service: Not on file     Active member of club or organization: Not on file     Attends meetings of clubs or organizations: Not on file     Relationship status: Not on file    Intimate partner violence     Fear of current or ex partner: Not on file     Emotionally abused: Not on file     Physically abused: Not on file     Forced sexual activity: Not on file   Other Topics Concern    Not on file   Social History Narrative    Not on file       Allergies   Allergen Reactions    Hydrocodone      headache    Ciprofloxacin     Codeine     Erythromycin     Iodine Other (See Comments)     dye-rash    Iv Dye  [Iodinated Diagnostic Agents]     Povidone Iodine        Current Outpatient Medications:     clonazePAM (KlonoPIN) 2 mg tablet, TAKE 1 TABLET (2 MG TOTAL) BY MOUTH NIGHTLY , Disp: , Rfl: 0    gabapentin (NEURONTIN) 300 mg capsule, TAKE 2 CAPSULES (600 MG TOTAL) BY MOUTH NIGHTLY , Disp: , Rfl:     pantoprazole (PROTONIX) 40 mg tablet, TAKE 1 TABLET (40 MG TOTAL) BY MOUTH 2 (TWO) TIMES A DAY , Disp: , Rfl:     PARoxetine (PAXIL) 20 mg tablet, TAKE 1 TABLET BY MOUTH EVERY DAY, Disp: , Rfl:     phentermine 37 5 MG capsule, Take 37 5 mg by mouth, Disp: , Rfl:     zolpidem (AMBIEN CR) 12 5 MG CR tablet, TAKE 1 TABLET BY MOUTH AT BEDTIME, Disp: , Rfl:     Review of Systems:    Review of Systems  Constitutional :no fever, feels well, no tiredness, no recent weight gain or loss  ENT: no ear ache, no loss of hearing, no nosebleeds or nasal discharge, no sore throat or hoarseness  Cardiovascular: no complaints of slow or fast heart beat, no chest pain, no palpitations, no leg claudication or lower extremity edema  Respiratory: no complaints of shortness of shortness of breath, no ELLER  Breasts:no complaints of breast pain, breast lump, or nipple discharge  Gastrointestinal: no complaints of abdominal pain, constipation, nausea, vomiting, or diarrhea or bloody stools  Genitourinary : no complaints of dysuria, incontinence, pelvic pain, no dysmenorrhea, vaginal discharge or abnormal vaginal bleeding and as noted in HPI  Musculoskeletal: no complaints of arthralgia, no myalgia, no joint swelling or stiffness, no limb pain or swelling    Integumentary: no complaints of skin rash or lesion, itching or dry skin  Neurological: no complaints of headache, no confusion, no numbness or tingling, no dizziness or fainting    Objective      LMP 10/02/2018     General:   appears stated age, cooperative, alert normal mood and affect   Neck: normal, supple,trachea midline, no masses   Heart: regular rate and rhythm, S1, S2 normal, no murmur, click, rub or gallop   Lungs: clear to auscultation bilaterally   Breasts: normal appearance, no masses or tenderness, Inspection negative, No nipple retraction or dimpling, No nipple discharge or bleeding, No axillary or supraclavicular adenopathy, Normal to palpation without dominant masses   Abdomen: soft, non-tender, without masses or organomegaly   Vulva: normal female genitalia, Bartholin's, Urethra, [place] normal, no lesions, normal female hair distribution   Vagina: normal vagina, no discharge, exudate, lesion, or erythema   Urethra: normal   Cervix: Normal, no discharge  PAP done     Uterus: normal size, contour, position, consistency, mobility, non-tender   Adnexa: normal adnexa   Lymphatic palpation of lymph nodes in neck, axilla, groin and/or other locations: no lymphadenopathy or masses noted   Skin normal skin turgor and no rashes bruises on thighs bilaterally, tender   Psychiatric orientation to person, place, and time: normal  mood and affect: normal

## 2021-03-18 LAB
HPV HR 12 DNA CVX QL NAA+PROBE: NEGATIVE
HPV16 DNA CVX QL NAA+PROBE: NEGATIVE
HPV18 DNA CVX QL NAA+PROBE: NEGATIVE

## 2021-03-22 LAB
LAB AP GYN PRIMARY INTERPRETATION: NORMAL
Lab: NORMAL

## 2022-02-01 ENCOUNTER — HOSPITAL ENCOUNTER (OUTPATIENT)
Dept: MAMMOGRAPHY | Facility: MEDICAL CENTER | Age: 52
Discharge: HOME | End: 2022-02-01
Payer: COMMERCIAL

## 2022-02-01 VITALS — HEIGHT: 64 IN | BODY MASS INDEX: 29.81 KG/M2 | WEIGHT: 174.6 LBS

## 2022-02-01 DIAGNOSIS — Z12.31 ENCOUNTER FOR SCREENING MAMMOGRAM FOR MALIGNANT NEOPLASM OF BREAST: ICD-10-CM

## 2022-02-01 PROCEDURE — 77067 SCR MAMMO BI INCL CAD: CPT

## 2022-02-01 PROCEDURE — 77063 BREAST TOMOSYNTHESIS BI: CPT

## 2022-10-04 ENCOUNTER — ANNUAL EXAM (OUTPATIENT)
Dept: OBGYN CLINIC | Facility: CLINIC | Age: 52
End: 2022-10-04
Payer: COMMERCIAL

## 2022-10-04 VITALS
DIASTOLIC BLOOD PRESSURE: 90 MMHG | BODY MASS INDEX: 29.88 KG/M2 | SYSTOLIC BLOOD PRESSURE: 140 MMHG | WEIGHT: 175 LBS | HEIGHT: 64 IN

## 2022-10-04 DIAGNOSIS — Z12.31 ENCOUNTER FOR SCREENING MAMMOGRAM FOR MALIGNANT NEOPLASM OF BREAST: ICD-10-CM

## 2022-10-04 DIAGNOSIS — N95.1 MENOPAUSAL SYMPTOMS: ICD-10-CM

## 2022-10-04 DIAGNOSIS — Z01.419 ENCOUNTER FOR WELL WOMAN EXAM WITH ROUTINE GYNECOLOGICAL EXAM: Primary | ICD-10-CM

## 2022-10-04 PROCEDURE — G0145 SCR C/V CYTO,THINLAYER,RESCR: HCPCS | Performed by: OBSTETRICS & GYNECOLOGY

## 2022-10-04 PROCEDURE — S0612 ANNUAL GYNECOLOGICAL EXAMINA: HCPCS | Performed by: OBSTETRICS & GYNECOLOGY

## 2022-10-04 RX ORDER — CELECOXIB 200 MG/1
CAPSULE ORAL
COMMUNITY
Start: 2022-10-04

## 2022-10-04 RX ORDER — METHOCARBAMOL 500 MG/1
TABLET, FILM COATED ORAL
COMMUNITY
Start: 2022-10-04

## 2022-10-04 RX ORDER — CLONIDINE HYDROCHLORIDE 0.1 MG/1
0.1 TABLET ORAL EVERY 12 HOURS SCHEDULED
Qty: 30 TABLET | Refills: 3 | Status: SHIPPED | OUTPATIENT
Start: 2022-10-04

## 2022-10-04 RX ORDER — DULOXETIN HYDROCHLORIDE 30 MG/1
CAPSULE, DELAYED RELEASE ORAL
COMMUNITY
Start: 2022-10-03

## 2022-10-04 NOTE — PROGRESS NOTES
ASSESSMENT & PLAN: Lamont Abarca is a 47 y o  [id number] with normal gynecologic exam     1   Routine well woman exam done today  2  Pap and HPV:  The patient's last pap and hpv was   It was normal   Pap with HPV was done today  Current ASCCP Guidelines reviewed  Annual paps per patient's request  3  Mammogram ordered  4  The following were reviewed in today's visit: breast self exam and mammography screening ordered    CC:  Annual Gynecologic Examination    HPI: Lamont Abarca is a 47 y o  [id number] who presents for annual gynecologic examination  She has the following concerns:  No GYN complaints; doing well  Reports increased hot flashes, night sweats  Was recently weaned off gabapentin  Interested in trying another nonhormonal method  Will trial clonidine  Discussed side effects of dry mouth and constipation  Follow up in 3 months for medication check  Health Maintenance:    She wears her seatbelt routinely  She does perform regular monthly self breast exams  She feels safe at home  There is no problem list on file for this patient  Past Medical History:   Diagnosis Date    Hx of skin cancer, basal cell        Past Surgical History:   Procedure Laterality Date    BREAST BIOPSY Right 10/26/2012    benign    TOE SURGERY  2020    tops of both 4th toes       Past OB/Gyn History:  OB History        2    Para   1    Term   0       1    AB   1    Living   1       SAB   1    IAB        Ectopic        Multiple        Live Births   1                  Pt does not have menstrual issues     History of sexually transmitted infection: No   History of abnormal pap smears: No      Patient is currently sexually active  heterosexual  The current method of family planning is tubal ligation      Family History   Problem Relation Age of Onset    No Known Problems Mother     No Known Problems Father     No Known Problems Maternal Grandmother     No Known Problems Maternal Grandfather     Breast cancer Paternal Grandmother         age unknown    No Known Problems Paternal Grandfather     No Known Problems Maternal Aunt     Skin cancer Maternal Aunt         age unknown       Social History:  Social History     Socioeconomic History    Marital status:      Spouse name: Not on file    Number of children: Not on file    Years of education: Not on file    Highest education level: Not on file   Occupational History    Not on file   Tobacco Use    Smoking status: Never Smoker    Smokeless tobacco: Never Used   Vaping Use    Vaping Use: Never used   Substance and Sexual Activity    Alcohol use:  Yes    Drug use: No    Sexual activity: Yes     Partners: Male     Birth control/protection: Post-menopausal   Other Topics Concern    Not on file   Social History Narrative    Not on file     Social Determinants of Health     Financial Resource Strain: Not on file   Food Insecurity: Not on file   Transportation Needs: Not on file   Physical Activity: Not on file   Stress: Not on file   Social Connections: Not on file   Intimate Partner Violence: Not on file   Housing Stability: Not on file       Allergies   Allergen Reactions    Hydrocodone      headache    Ciprofloxacin     Codeine     Erythromycin     Iodine - Food Allergy Other (See Comments)     dye-rash    Iv Dye  [Iodinated Diagnostic Agents]     Povidone Iodine     Penicillins Rash and Swelling     rash, swollen lips; tolerates ceftriaxone         Current Outpatient Medications:     buPROPion (WELLBUTRIN SR) 150 mg 12 hr tablet, Take 150 mg by mouth 2 (two) times a day, Disp: , Rfl:     celecoxib (CeleBREX) 200 mg capsule, , Disp: , Rfl:     clonazePAM (KlonoPIN) 2 mg tablet, TAKE 1 TABLET (2 MG TOTAL) BY MOUTH NIGHTLY , Disp: , Rfl: 0    cloNIDine (Catapres) 0 1 mg tablet, Take 1 tablet (0 1 mg total) by mouth every 12 (twelve) hours, Disp: 30 tablet, Rfl: 3    DULoxetine (CYMBALTA) 30 mg delayed release capsule, , Disp: , Rfl:     methocarbamol (ROBAXIN) 500 mg tablet, , Disp: , Rfl:     pantoprazole (PROTONIX) 40 mg tablet, TAKE 1 TABLET (40 MG TOTAL) BY MOUTH 2 (TWO) TIMES A DAY , Disp: , Rfl:     PARoxetine (PAXIL) 20 mg tablet, TAKE 1 TABLET BY MOUTH EVERY DAY, Disp: , Rfl:     zolpidem (AMBIEN CR) 12 5 MG CR tablet, TAKE 1 TABLET BY MOUTH AT BEDTIME, Disp: , Rfl:     Review of Systems:    Review of Systems  Constitutional :no fever, feels well, no tiredness, no recent weight gain or loss  ENT: no ear ache, no loss of hearing, no nosebleeds or nasal discharge, no sore throat or hoarseness  Cardiovascular: no complaints of slow or fast heart beat, no chest pain, no palpitations, no leg claudication or lower extremity edema  Respiratory: no complaints of shortness of shortness of breath, no ELLER  Breasts:no complaints of breast pain, breast lump, or nipple discharge  Gastrointestinal: no complaints of abdominal pain, constipation, nausea, vomiting, or diarrhea or bloody stools  Genitourinary : no complaints of dysuria, incontinence, pelvic pain, no dysmenorrhea, vaginal discharge or abnormal vaginal bleeding and as noted in HPI  Musculoskeletal: no complaints of arthralgia, no myalgia, no joint swelling or stiffness, no limb pain or swelling    Integumentary: no complaints of skin rash or lesion, itching or dry skin  Neurological: no complaints of headache, no confusion, no numbness or tingling, no dizziness or fainting    Objective      /90 (BP Location: Right arm, Patient Position: Sitting, Cuff Size: Adult)   Ht 5' 4" (1 626 m)   Wt 79 4 kg (175 lb)   LMP 10/02/2018   BMI 30 04 kg/m²     General:   appears stated age, cooperative, alert normal mood and affect   Neck: normal, supple,trachea midline, no masses   Heart: regular rate and rhythm, S1, S2 normal, no murmur, click, rub or gallop   Lungs: clear to auscultation bilaterally   Breasts: normal appearance, no masses or tenderness, Inspection negative, No nipple retraction or dimpling, No nipple discharge or bleeding, No axillary or supraclavicular adenopathy, Normal to palpation without dominant masses   Abdomen: soft, non-tender, without masses or organomegaly   Vulva: normal female genitalia, Bartholin's, Urethra, [place] normal, no lesions, normal female hair distribution   Vagina: normal vagina, no discharge, exudate, lesion, or erythema   Urethra: normal   Cervix: Normal, no discharge  PAP done     Uterus: normal size, contour, position, consistency, mobility, non-tender   Adnexa: normal adnexa   Lymphatic palpation of lymph nodes in neck, axilla, groin and/or other locations: no lymphadenopathy or masses noted   Skin normal skin turgor and no rashes bruises on thighs bilaterally, tender   Psychiatric orientation to person, place, and time: normal  mood and affect: normal

## 2022-10-13 LAB
LAB AP GYN PRIMARY INTERPRETATION: NORMAL
Lab: NORMAL

## 2022-11-30 ENCOUNTER — TELEPHONE (OUTPATIENT)
Dept: OBGYN CLINIC | Facility: MEDICAL CENTER | Age: 52
End: 2022-11-30

## 2022-11-30 DIAGNOSIS — N95.1 MENOPAUSAL SYMPTOMS: ICD-10-CM

## 2022-12-01 RX ORDER — CLONIDINE HYDROCHLORIDE 0.1 MG/1
0.1 TABLET ORAL EVERY 12 HOURS SCHEDULED
Qty: 90 TABLET | Refills: 0 | Status: SHIPPED | OUTPATIENT
Start: 2022-12-01

## 2023-01-16 DIAGNOSIS — N95.1 MENOPAUSAL SYMPTOMS: Primary | ICD-10-CM

## 2023-01-17 RX ORDER — CLONIDINE HYDROCHLORIDE 0.1 MG/1
0.1 TABLET ORAL EVERY 12 HOURS SCHEDULED
Qty: 90 TABLET | Refills: 0 | Status: SHIPPED | OUTPATIENT
Start: 2023-01-17

## 2023-02-10 DIAGNOSIS — N95.1 MENOPAUSAL SYMPTOMS: ICD-10-CM

## 2023-02-10 RX ORDER — CLONIDINE HYDROCHLORIDE 0.1 MG/1
0.1 TABLET ORAL EVERY 12 HOURS SCHEDULED
Qty: 180 TABLET | Refills: 0 | Status: SHIPPED | OUTPATIENT
Start: 2023-02-10

## 2023-03-28 ENCOUNTER — HOSPITAL ENCOUNTER (OUTPATIENT)
Dept: MAMMOGRAPHY | Facility: MEDICAL CENTER | Age: 53
Discharge: HOME | End: 2023-03-28

## 2023-03-28 VITALS — BODY MASS INDEX: 29.88 KG/M2 | WEIGHT: 175 LBS | HEIGHT: 64 IN

## 2023-03-28 DIAGNOSIS — Z12.31 ENCOUNTER FOR SCREENING MAMMOGRAM FOR MALIGNANT NEOPLASM OF BREAST: ICD-10-CM

## 2023-04-25 DIAGNOSIS — N95.1 MENOPAUSAL SYMPTOMS: ICD-10-CM

## 2023-04-26 RX ORDER — CLONIDINE HYDROCHLORIDE 0.1 MG/1
TABLET ORAL
Qty: 180 TABLET | Refills: 0 | Status: SHIPPED | OUTPATIENT
Start: 2023-04-26

## 2023-08-01 DIAGNOSIS — N95.1 MENOPAUSAL SYMPTOMS: ICD-10-CM

## 2023-08-01 RX ORDER — CLONIDINE HYDROCHLORIDE 0.1 MG/1
0.1 TABLET ORAL EVERY 12 HOURS
Qty: 180 TABLET | Refills: 0 | Status: SHIPPED | OUTPATIENT
Start: 2023-08-01

## 2023-08-01 NOTE — TELEPHONE ENCOUNTER
Patient called about a refill for her clonidine. Pharmacy on file. Please review when you get a chance.  Thank you

## 2023-09-19 ENCOUNTER — OFFICE VISIT (OUTPATIENT)
Dept: URGENT CARE | Facility: CLINIC | Age: 53
End: 2023-09-19
Payer: COMMERCIAL

## 2023-09-19 VITALS
SYSTOLIC BLOOD PRESSURE: 126 MMHG | HEART RATE: 78 BPM | OXYGEN SATURATION: 98 % | RESPIRATION RATE: 18 BRPM | DIASTOLIC BLOOD PRESSURE: 78 MMHG | TEMPERATURE: 98.2 F

## 2023-09-19 DIAGNOSIS — R05.1 ACUTE COUGH: Primary | ICD-10-CM

## 2023-09-19 DIAGNOSIS — J02.9 SORE THROAT: ICD-10-CM

## 2023-09-19 LAB
S PYO AG THROAT QL: NEGATIVE
SARS-COV-2 AG UPPER RESP QL IA: NEGATIVE
VALID CONTROL: NORMAL

## 2023-09-19 PROCEDURE — 87636 SARSCOV2 & INF A&B AMP PRB: CPT

## 2023-09-19 PROCEDURE — 87070 CULTURE OTHR SPECIMN AEROBIC: CPT

## 2023-09-19 PROCEDURE — 87880 STREP A ASSAY W/OPTIC: CPT

## 2023-09-19 PROCEDURE — 87811 SARS-COV-2 COVID19 W/OPTIC: CPT

## 2023-09-19 PROCEDURE — 99213 OFFICE O/P EST LOW 20 MIN: CPT

## 2023-09-19 RX ORDER — BENZONATATE 100 MG/1
100 CAPSULE ORAL 3 TIMES DAILY PRN
Qty: 20 CAPSULE | Refills: 0 | Status: SHIPPED | OUTPATIENT
Start: 2023-09-19

## 2023-09-19 NOTE — PATIENT INSTRUCTIONS
Use benzonatate as directed as needed for cough. Recommend throat lozenges, anesthetic spray such as chloraseptic, and gargling warm salt water for throat irritation. Hydration and rest.  Acetaminophen  for pain relief and fever reduction. COVID/influenza testing. Throat culture sent. Use the [facility] MyChart to obtain lab results. PCP follow up in 3-5 days. Go to an emergency department if difficulty breathing occurs or if symptoms worsen.     Recommended supplements for potential COVID-19 is the following: Vitamin D3 2000 IU  daily ,  Vitamin C 1g  every 12 hours , Multivitamin Daily

## 2023-09-19 NOTE — PROGRESS NOTES
[facility]        NAME: Sebastien Dela Cruz is a 47 y.o. female  : 1970    MRN: [id number]  DATE: 2023  TIME: 10:48 AM      Assessment and Plan     Acute cough [R05.1]  1. Acute cough  Poct Covid 19 Rapid Antigen Test    benzonatate (TESSALON PERLES) 100 mg capsule    Covid/Flu-Office Collect      2. Sore throat  POCT rapid strepA    Throat culture        Rapid strep negative. Throat culture sent. Will hold on antibiotics at this time- no tonsils, no exudate, no fever, presence of cough    Rapid covid negative. Agreeable to PCR testing. Patient Instructions     Use benzonatate as directed as needed for cough. Recommend throat lozenges, anesthetic spray such as chloraseptic, and gargling warm salt water for throat irritation. Hydration and rest.  Acetaminophen  for pain relief and fever reduction. COVID/influenza testing. Throat culture sent. Use the [facility] MyChart to obtain lab results. PCP follow up in 3-5 days. Go to an emergency department if difficulty breathing occurs or if symptoms worsen. Recommended supplements for potential COVID-19 is the following: Vitamin D3 2000 IU  daily ,  Vitamin C 1g  every 12 hours , Multivitamin Daily   Chief Complaint     Chief Complaint   Patient presents with   • Sore Throat     Patient relates nasal congestion, ear pain, sore throat, cough. Started  night. History of Present Illness     Patient is a 60-year-old female who presents with cough, congestion, ear pain, and sore throat for 2 days. Reports son has allergies. Denies n/v/d. Reports asthma history- does have medications at home. Review of Systems     Review of Systems   Constitutional: Negative for fever. HENT: Positive for ear pain and sore throat. Respiratory: Positive for cough. Gastrointestinal: Negative for diarrhea, nausea and vomiting. All other systems reviewed and are negative.         Current Medications       Current Outpatient Medications:   •  benzonatate (TESSALON PERLES) 100 mg capsule, Take 1 capsule (100 mg total) by mouth 3 (three) times a day as needed for cough, Disp: 20 capsule, Rfl: 0  •  buPROPion (WELLBUTRIN SR) 150 mg 12 hr tablet, Take 150 mg by mouth 2 (two) times a day, Disp: , Rfl:   •  celecoxib (CeleBREX) 200 mg capsule, , Disp: , Rfl:   •  clonazePAM (KlonoPIN) 2 mg tablet, TAKE 1 TABLET (2 MG TOTAL) BY MOUTH NIGHTLY., Disp: , Rfl: 0  •  cloNIDine (CATAPRES) 0.1 mg tablet, Take 1 tablet (0.1 mg total) by mouth every 12 (twelve) hours, Disp: 180 tablet, Rfl: 0  •  DULoxetine (CYMBALTA) 30 mg delayed release capsule, , Disp: , Rfl:   •  methocarbamol (ROBAXIN) 500 mg tablet, , Disp: , Rfl:   •  pantoprazole (PROTONIX) 40 mg tablet, TAKE 1 TABLET (40 MG TOTAL) BY MOUTH 2 (TWO) TIMES A DAY., Disp: , Rfl:   •  PARoxetine (PAXIL) 20 mg tablet, TAKE 1 TABLET BY MOUTH EVERY DAY, Disp: , Rfl:   •  zolpidem (AMBIEN CR) 12.5 MG CR tablet, TAKE 1 TABLET BY MOUTH AT BEDTIME, Disp: , Rfl:     Current Allergies     Allergies as of 09/19/2023 - Reviewed 09/19/2023   Allergen Reaction Noted   • Hydrocodone  10/21/2016   • Ciprofloxacin  10/26/2012   • Codeine  05/06/2015   • Erythromycin  05/06/2015   • Iodine - food allergy Other (See Comments) 04/30/2007   • Iv dye  [iodinated contrast media]  12/11/2013   • Povidone iodine  10/26/2012   • Penicillins Rash and Swelling 04/30/2007              The following portions of the patient's history were reviewed and updated as appropriate: allergies, current medications, past family history, past medical history, past social history, past surgical history and problem list.     Past Medical History:   Diagnosis Date   • Hx of skin cancer, basal cell        Past Surgical History:   Procedure Laterality Date   • BREAST BIOPSY Right 10/26/2012    benign   • TOE SURGERY  2020    tops of both 4th toes       Family History   Problem Relation Age of Onset   • No Known Problems Mother    • No Known Problems Father    • No Known Problems Maternal Grandmother    • No Known Problems Maternal Grandfather    • Breast cancer Paternal Grandmother         age unknown   • No Known Problems Paternal Grandfather    • No Known Problems Maternal Aunt    • Skin cancer Maternal Aunt         age unknown         Medications have been verified. Objective     /78   Pulse 78   Temp 98.2 °F (36.8 °C)   Resp 18   LMP 10/02/2018   SpO2 98%   Patient's last menstrual period was 10/02/2018. Physical Exam     Physical Exam  Vitals and nursing note reviewed. Constitutional:       General: She is awake. She is not in acute distress. Appearance: Normal appearance. She is not ill-appearing, toxic-appearing or diaphoretic. HENT:      Right Ear: Tympanic membrane, ear canal and external ear normal.      Left Ear: Tympanic membrane, ear canal and external ear normal.      Nose: Congestion present. Mouth/Throat:      Lips: Pink. Mouth: Mucous membranes are moist.      Pharynx: Oropharynx is clear. Uvula midline. No pharyngeal swelling, oropharyngeal exudate, posterior oropharyngeal erythema or uvula swelling. Tonsils: 0 on the right. 0 on the left. Cardiovascular:      Rate and Rhythm: Normal rate. Pulses: Normal pulses. Heart sounds: Normal heart sounds, S1 normal and S2 normal.   Pulmonary:      Effort: Pulmonary effort is normal.      Breath sounds: Normal breath sounds and air entry. Skin:     General: Skin is warm. Capillary Refill: Capillary refill takes less than 2 seconds. Neurological:      Mental Status: She is alert. Psychiatric:         Mood and Affect: Mood normal.         Behavior: Behavior normal.         Thought Content:  Thought content normal.         Judgment: Judgment normal.

## 2023-09-20 LAB
FLUAV RNA RESP QL NAA+PROBE: NEGATIVE
FLUBV RNA RESP QL NAA+PROBE: NEGATIVE
SARS-COV-2 RNA RESP QL NAA+PROBE: NEGATIVE

## 2023-09-22 LAB — BACTERIA THROAT CULT: NORMAL

## 2024-04-18 ENCOUNTER — HOSPITAL ENCOUNTER (OUTPATIENT)
Dept: MAMMOGRAPHY | Facility: MEDICAL CENTER | Age: 54
Discharge: HOME | End: 2024-04-18
Payer: COMMERCIAL

## 2024-04-18 VITALS — WEIGHT: 175 LBS | HEIGHT: 64 IN | BODY MASS INDEX: 29.88 KG/M2

## 2024-04-18 DIAGNOSIS — Z12.31 ENCOUNTER FOR SCREENING MAMMOGRAM FOR MALIGNANT NEOPLASM OF BREAST: ICD-10-CM

## 2024-04-18 PROCEDURE — 77063 BREAST TOMOSYNTHESIS BI: CPT

## 2024-04-18 PROCEDURE — 77067 SCR MAMMO BI INCL CAD: CPT

## 2024-05-16 ENCOUNTER — ANNUAL EXAM (OUTPATIENT)
Dept: OBGYN CLINIC | Facility: CLINIC | Age: 54
End: 2024-05-16
Payer: COMMERCIAL

## 2024-05-16 VITALS
DIASTOLIC BLOOD PRESSURE: 80 MMHG | HEIGHT: 64 IN | WEIGHT: 193.2 LBS | BODY MASS INDEX: 32.98 KG/M2 | SYSTOLIC BLOOD PRESSURE: 110 MMHG

## 2024-05-16 DIAGNOSIS — Z12.31 ENCOUNTER FOR SCREENING MAMMOGRAM FOR BREAST CANCER: ICD-10-CM

## 2024-05-16 DIAGNOSIS — Z12.4 PAP SMEAR FOR CERVICAL CANCER SCREENING: ICD-10-CM

## 2024-05-16 DIAGNOSIS — Z01.419 ENCOUNTER FOR WELL WOMAN EXAM WITH ROUTINE GYNECOLOGICAL EXAM: Primary | ICD-10-CM

## 2024-05-16 PROCEDURE — G0145 SCR C/V CYTO,THINLAYER,RESCR: HCPCS | Performed by: OBSTETRICS & GYNECOLOGY

## 2024-05-16 PROCEDURE — S0612 ANNUAL GYNECOLOGICAL EXAMINA: HCPCS | Performed by: OBSTETRICS & GYNECOLOGY

## 2024-05-16 RX ORDER — LISINOPRIL 5 MG/1
5 TABLET ORAL DAILY
COMMUNITY

## 2024-05-16 NOTE — PROGRESS NOTES
ASSESSMENT & PLAN: Nicole Thomas is a 53 y.o.  with normal gynecologic exam.    1.  Routine well woman exam done today  2.  Pap and HPV:  The patient's last pap and hpv was   It was normal.  Pap with HPV was done today.    Current ASCCP Guidelines reviewed. Annual paps per patient's request  3.  Mammogram ordered  4. The following were reviewed in today's visit: breast self exam and mammography screening ordered    CC:  Annual Gynecologic Examination    HPI: Nicole Thomas is a 53 y.o.  who presents for annual gynecologic examination.  She has the following concerns:  No GYN complaints; doing well    Health Maintenance:    She wears her seatbelt routinely.    She does perform regular monthly self breast exams.    She feels safe at home.     There is no problem list on file for this patient.      Past Medical History:   Diagnosis Date    Hx of skin cancer, basal cell        Past Surgical History:   Procedure Laterality Date    BREAST BIOPSY Right 10/26/2012    benign    TOE SURGERY  2020    tops of both 4th toes       Past OB/Gyn History:  OB History          2    Para   1    Term   0       1    AB   1    Living   1         SAB   1    IAB        Ectopic        Multiple        Live Births   1                  Pt does not have menstrual issues. .  History of sexually transmitted infection: No.  History of abnormal pap smears: No .    Patient is currently sexually active.  heterosexual. The current method of family planning is tubal ligation.    Family History   Problem Relation Age of Onset    No Known Problems Mother     No Known Problems Father     No Known Problems Maternal Grandmother     No Known Problems Maternal Grandfather     Breast cancer Paternal Grandmother         age unknown    No Known Problems Paternal Grandfather     No Known Problems Maternal Aunt     Skin cancer Maternal Aunt         age unknown       Social History:  Social History     Socioeconomic History     Marital status:      Spouse name: Not on file    Number of children: Not on file    Years of education: Not on file    Highest education level: Not on file   Occupational History    Not on file   Tobacco Use    Smoking status: Never    Smokeless tobacco: Never   Vaping Use    Vaping status: Never Used   Substance and Sexual Activity    Alcohol use: Yes    Drug use: No    Sexual activity: Yes     Partners: Male     Birth control/protection: Post-menopausal   Other Topics Concern    Not on file   Social History Narrative    Not on file     Social Determinants of Health     Financial Resource Strain: Not on file   Food Insecurity: Not on file   Transportation Needs: Not on file   Physical Activity: Not on file   Stress: Not on file   Social Connections: Not on file   Intimate Partner Violence: Not on file   Housing Stability: Not on file       Allergies   Allergen Reactions    Hydrocodone      headache    Ciprofloxacin     Codeine     Erythromycin     Iodine - Food Allergy Other (See Comments)     dye-rash    Iv Dye  [Iodinated Contrast Media]     Povidone Iodine     Penicillins Rash and Swelling     rash, swollen lips; tolerates ceftriaxone         Current Outpatient Medications:     buPROPion (WELLBUTRIN SR) 150 mg 12 hr tablet, Take 150 mg by mouth 2 (two) times a day, Disp: , Rfl:     celecoxib (CeleBREX) 200 mg capsule, , Disp: , Rfl:     clonazePAM (KlonoPIN) 2 mg tablet, TAKE 1 TABLET (2 MG TOTAL) BY MOUTH NIGHTLY., Disp: , Rfl: 0    cloNIDine (CATAPRES) 0.1 mg tablet, Take 1 tablet (0.1 mg total) by mouth every 12 (twelve) hours, Disp: 180 tablet, Rfl: 3    DULoxetine (CYMBALTA) 30 mg delayed release capsule, , Disp: , Rfl:     lisinopril (ZESTRIL) 5 mg tablet, Take 5 mg by mouth daily, Disp: , Rfl:     methocarbamol (ROBAXIN) 500 mg tablet, , Disp: , Rfl:     pantoprazole (PROTONIX) 40 mg tablet, TAKE 1 TABLET (40 MG TOTAL) BY MOUTH 2 (TWO) TIMES A DAY., Disp: , Rfl:     zolpidem (AMBIEN CR) 12.5 MG CR  "tablet, TAKE 1 TABLET BY MOUTH AT BEDTIME, Disp: , Rfl:     PARoxetine (PAXIL) 20 mg tablet, TAKE 1 TABLET BY MOUTH EVERY DAY (Patient not taking: Reported on 5/16/2024), Disp: , Rfl:     Review of Systems:    Review of Systems  Constitutional :no fever, feels well, no tiredness, no recent weight gain or loss  ENT: no ear ache, no loss of hearing, no nosebleeds or nasal discharge, no sore throat or hoarseness.  Cardiovascular: no complaints of slow or fast heart beat, no chest pain, no palpitations, no leg claudication or lower extremity edema.  Respiratory: no complaints of shortness of shortness of breath, no ELLER  Breasts:no complaints of breast pain, breast lump, or nipple discharge  Gastrointestinal: no complaints of abdominal pain, constipation, nausea, vomiting, or diarrhea or bloody stools  Genitourinary : no complaints of dysuria, incontinence, pelvic pain, no dysmenorrhea, vaginal discharge or abnormal vaginal bleeding and as noted in HPI.  Musculoskeletal: no complaints of arthralgia, no myalgia, no joint swelling or stiffness, no limb pain or swelling.  Integumentary: no complaints of skin rash or lesion, itching or dry skin  Neurological: no complaints of headache, no confusion, no numbness or tingling, no dizziness or fainting    Objective      /80   Ht 5' 4\" (1.626 m)   Wt 87.6 kg (193 lb 3.2 oz)   LMP 10/02/2018   BMI 33.16 kg/m²     General:   appears stated age, cooperative, alert normal mood and affect   Neck: normal, supple,trachea midline, no masses   Heart: regular rate and rhythm, S1, S2 normal, no murmur, click, rub or gallop   Lungs: clear to auscultation bilaterally   Breasts: normal appearance, no masses or tenderness, Inspection negative, No nipple retraction or dimpling, No nipple discharge or bleeding, No axillary or supraclavicular adenopathy, Normal to palpation without dominant masses   Abdomen: soft, non-tender, without masses or organomegaly   Vulva: normal female " genitalia, Bartholin's, Urethra, [place] normal, no lesions, normal female hair distribution   Vagina: normal vagina, no discharge, exudate, lesion, or erythema   Urethra: normal   Cervix: Normal, no discharge. PAP done.   Uterus: normal size, contour, position, consistency, mobility, non-tender   Adnexa: normal adnexa   Lymphatic palpation of lymph nodes in neck, axilla, groin and/or other locations: no lymphadenopathy or masses noted   Skin normal skin turgor and no rashes bruises on thighs bilaterally, tender   Psychiatric orientation to person, place, and time: normal. mood and affect: normal

## 2024-05-28 LAB
LAB AP GYN PRIMARY INTERPRETATION: NORMAL
Lab: NORMAL

## 2024-08-26 ENCOUNTER — OFFICE VISIT (OUTPATIENT)
Dept: OBGYN CLINIC | Facility: MEDICAL CENTER | Age: 54
End: 2024-08-26
Payer: COMMERCIAL

## 2024-08-26 ENCOUNTER — NURSE TRIAGE (OUTPATIENT)
Age: 54
End: 2024-08-26

## 2024-08-26 VITALS
SYSTOLIC BLOOD PRESSURE: 130 MMHG | WEIGHT: 192 LBS | BODY MASS INDEX: 32.78 KG/M2 | HEIGHT: 64 IN | DIASTOLIC BLOOD PRESSURE: 82 MMHG

## 2024-08-26 DIAGNOSIS — B36.9 SKIN DISEASE, FUNGAL: ICD-10-CM

## 2024-08-26 DIAGNOSIS — N64.4 BREAST PAIN, RIGHT: Primary | ICD-10-CM

## 2024-08-26 PROCEDURE — 99213 OFFICE O/P EST LOW 20 MIN: CPT | Performed by: NURSE PRACTITIONER

## 2024-08-26 RX ORDER — NYSTATIN AND TRIAMCINOLONE ACETONIDE 100000; 1 [USP'U]/G; MG/G
OINTMENT TOPICAL 2 TIMES DAILY
Qty: 30 G | Refills: 0 | Status: SHIPPED | OUTPATIENT
Start: 2024-08-26

## 2024-08-26 NOTE — PATIENT INSTRUCTIONS
avoid excessive caffeine, nicotine, salt, soy and red wine.  Can use ibuprofen, tylenol or alleve as needed for pain.  Can also supplement with Arnica for pain.  Reviewed supplementation including magnesium,  vitamin E 200 IU daily , evening primrose oil and vitamin B6 40 mg daily. Wearing a supportive bra. Heat/ ice.

## 2024-08-26 NOTE — PROGRESS NOTES
"Assessment/Plan:      Diagnoses and all orders for this visit:    Breast pain, right  -     Mammo diagnostic right w 3d & cad; Future  -     US breast right limited (diagnostic); Future    Skin disease, fungal  -     nystatin-triamcinolone (MYCOLOG-II) ointment; Apply topically 2 (two) times a day    Other orders  -     Cholecalciferol 125 MCG (5000 UT) TABS; Take 5,000 Units by mouth      -Reviewed recommendation for right breast diagnostic mammogram and ultrasound  -avoid excessive caffeine, nicotine, salt, soy and red wine.  Can use  tylenol  as needed for pain.  Can also supplement with Arnica for pain.  Reviewed supplementation including magnesium,  vitamin E 200 IU daily , evening primrose oil and vitamin B6 40 mg daily. Wearing a supportive bra. Heat/ ice.  -Fungal infection underneath both breasts Rx Mycolog use twice daily as needed.  -Will [email or web address] message with results    RTO 2025 for annual exam     Subjective:     Patient ID: Nicole Thomas is a 54 y.o. female.    HPI  here with complaints of right breast pain and lump for 3 days. LMP _postmenopausal denies bleeding.  Since onset symptoms have remained the same.  Associated symptoms include sharp stabbing pain that radiates up into axillar area, lump.  Denies alleviating factors.  Aggravating factors include anything touching area.  Has not used any OTC remedies.  Did sit in the hot tub last night.  Denies similar events.  Denies new medications, vitamins or supplements.  History of breast cancer in paternal grandmother.    Last Pap smear 5/15/24 NILM  Last mammogram 24 BI-RADS 2  CRC screening colonoscopy 11/3/23    Review of Systems   Constitutional:  Negative for chills, fatigue and fever.   Respiratory: Negative.     Cardiovascular: Negative.    Genitourinary: Negative.    Neurological:  Negative for headaches.         Objective:  /82   Ht 5' 4\" (1.626 m)   Wt 87.1 kg (192 lb)   LMP 10/02/2018   BMI 32.96 kg/m²      Physical " Exam  Vitals reviewed.   Constitutional:       Appearance: Normal appearance.   Chest:   Breasts:     Breasts are symmetrical.      Right: Mass and tenderness present. No swelling, bleeding, inverted nipple, nipple discharge or skin change.      Left: Normal.          Comments: Area of discomfort.  Mobile 1 to 2 cm oval shaped mass between 9 and 10:00  Lymphadenopathy:      Upper Body:      Right upper body: No supraclavicular, axillary or pectoral adenopathy.      Left upper body: No supraclavicular, axillary or pectoral adenopathy.   Neurological:      Mental Status: She is alert and oriented to person, place, and time.   Psychiatric:         Mood and Affect: Mood normal.         Behavior: Behavior normal.

## 2024-08-26 NOTE — TELEPHONE ENCOUNTER
"Reason for Disposition  • Patient wants to be seen    Answer Assessment - Initial Assessment Questions  1. SYMPTOM: \"What's the main symptom you're concerned about?\"  (e.g., lump, pain, rash, nipple discharge)      Feels \"something\" and feels different than other breast, painful to touch and generalized achiness  2. LOCATION: \"Where is the s/s located?\"      Right breast  3. ONSET: \"When did s/s  start?\"      Friday  4. PRIOR HISTORY: \"Do you have any history of prior problems with your breasts?\" (e.g., lumps, cancer, fibrocystic breast disease)      Right breast - years ago hx had surgical biopsy with marker being left  5. CAUSE: \"What do you think is causing this symptom?\"      Unsure  6. OTHER SYMPTOMS: \"Do you have any other symptoms?\" (e.g., fever, breast pain, redness or rash, nipple discharge)      Denies  7. PREGNANCY-BREASTFEEDING: \"Is there any chance you are pregnant?\" \"When was your last menstrual period?\" \"Are you breastfeeding?\"      Denies    Protocols used: Breast Symptoms-ADULT-[place]    "

## 2024-08-26 NOTE — TELEPHONE ENCOUNTER
Regarding: pain in right breast started on friday  ----- Message from Alyssa OHARA sent at 8/26/2024  9:03 AM EDT -----  Patient called and said on Friday her  right breast is different felt something weird and very sore and if she does not touch it still hurts and she said her right breast many years ago  had something taken out it is marked and does normal mammograms .  She said   once she touches it it is very sore and when done touching it its like a toothache and right breast and armpit  and it feels differently than left breast .  Please call her back at [phone]. Thank you

## 2024-08-26 NOTE — TELEPHONE ENCOUNTER
"Pt calling with s/s of Right Breast pain, tenderness to touch, and feeling like \"its different\" from Left breast. Feels something, but unsure if it is a lump or a cyst. Denies redness or rash to breast, nipple discharge, fever. Pt does note having a history of biopsy on that same breast \"years ago\". Concerned due to grandmother passing away from breast cancer. RN able to have patient be seen in office for further evaluation this afternoon at the [place] location. Pt agreeable to plans. No further questions.   "

## 2024-08-27 ENCOUNTER — TELEPHONE (OUTPATIENT)
Age: 54
End: 2024-08-27

## 2024-08-27 NOTE — TELEPHONE ENCOUNTER
Patient calling following appointment and scheduling for diagnostic breast imaging. Patient states they were not able to schedule her until October 22nd. She wanted to make Bing aware. Message to Bing regarding same.

## 2024-10-18 ENCOUNTER — TELEPHONE (OUTPATIENT)
Age: 54
End: 2024-10-18

## 2024-10-18 NOTE — TELEPHONE ENCOUNTER
Pt was seen by [facility] for breast concern. Patient states she's had to wait 8 weeks for diagnostic imaging - has upcoming appts 10/22. Patient asking if she should still keep appointment due to improvement of breast pain. States she is no longer feeling pain in breast all the time, but she still feels a small marble lump and feels breast tenderness and soreness that lasts a while upon palpation. States other breast feels fine. Denies new symptoms or fevers. States grandmother  of breast ca.     Advised pt keep upcoming imaging appointments as scheduled. Advised to call back with any new or worsening symptoms in the meantime. Informed pt provider will review scans once they are resulted. Patient verbalized understanding.

## 2024-10-22 ENCOUNTER — HOSPITAL ENCOUNTER (OUTPATIENT)
Dept: ULTRASOUND IMAGING | Facility: CLINIC | Age: 54
Discharge: HOME | End: 2024-10-22
Payer: COMMERCIAL

## 2024-10-22 ENCOUNTER — HOSPITAL ENCOUNTER (OUTPATIENT)
Dept: MAMMOGRAPHY | Facility: CLINIC | Age: 54
Discharge: HOME | End: 2024-10-22
Payer: COMMERCIAL

## 2024-10-22 DIAGNOSIS — N64.4 BREAST PAIN, RIGHT: ICD-10-CM

## 2024-10-22 PROCEDURE — 76642 ULTRASOUND BREAST LIMITED: CPT

## 2024-10-22 PROCEDURE — G0279 TOMOSYNTHESIS, MAMMO: HCPCS

## 2024-10-22 PROCEDURE — 77065 DX MAMMO INCL CAD UNI: CPT

## 2024-12-13 ENCOUNTER — OFFICE VISIT (OUTPATIENT)
Dept: URGENT CARE | Facility: CLINIC | Age: 54
End: 2024-12-13
Payer: COMMERCIAL

## 2024-12-13 VITALS
SYSTOLIC BLOOD PRESSURE: 114 MMHG | DIASTOLIC BLOOD PRESSURE: 74 MMHG | HEART RATE: 82 BPM | OXYGEN SATURATION: 97 % | RESPIRATION RATE: 19 BRPM | TEMPERATURE: 97.6 F

## 2024-12-13 DIAGNOSIS — J20.9 ACUTE BRONCHITIS, UNSPECIFIED ORGANISM: Primary | ICD-10-CM

## 2024-12-13 PROCEDURE — G0382 LEV 3 HOSP TYPE B ED VISIT: HCPCS | Performed by: PHYSICIAN ASSISTANT

## 2024-12-13 PROCEDURE — S9083 URGENT CARE CENTER GLOBAL: HCPCS | Performed by: PHYSICIAN ASSISTANT

## 2024-12-13 RX ORDER — ALBUTEROL SULFATE 0.83 MG/ML
2.5 SOLUTION RESPIRATORY (INHALATION) EVERY 6 HOURS PRN
Qty: 125 ML | Refills: 0 | Status: SHIPPED | OUTPATIENT
Start: 2024-12-13

## 2024-12-13 RX ORDER — BUDESONIDE 0.5 MG/2ML
0.5 INHALANT ORAL 2 TIMES DAILY
Qty: 120 ML | Refills: 0 | Status: SHIPPED | OUTPATIENT
Start: 2024-12-13

## 2024-12-13 RX ORDER — PILOCARPINE HYDROCHLORIDE 10 MG/ML
SOLUTION/ DROPS OPHTHALMIC
COMMUNITY
Start: 2024-09-19

## 2024-12-13 RX ORDER — PREDNISONE 20 MG/1
TABLET ORAL
Qty: 10 TABLET | Refills: 0 | Status: SHIPPED | OUTPATIENT
Start: 2024-12-13

## 2024-12-13 NOTE — PROGRESS NOTES
[facility]    NAME: Nicole Thomas is a 54 y.o. female  : 1970    MRN: [id number]  DATE: 2024  TIME: 11:38 AM    Assessment and Plan   Acute bronchitis, unspecified organism [J20.9]  1. Acute bronchitis, unspecified organism  budesonide (Pulmicort) 0.5 mg/2 mL nebulizer solution    albuterol (2.5 mg/3 mL) 0.083 % nebulizer solution    predniSONE 20 mg tablet          Patient Instructions     Patient Instructions   Prescription sent for albuterol and budesonide nebulizer treatment.  Budesonide twice a day and nebulizer.  Albuterol nebulizer every 4-6 hours as needed.    Take prednisone as instructed.  While on prednisone do not take any ibuprofen or ibuprofen like products.  May safely take Tylenol if needed.    Follow-up with primary care as needed.        Chief Complaint     Chief Complaint   Patient presents with    Cold Like Symptoms     Sick 1 month ago, now getting sick again with same s/s. Chest congestion, coughing, nasal congestion. No fevers.  Trying OTC meds without relief. Using tessalon pearls without relief. Pt has slight body aches. Pt was previously prescribed DOXY but did not take the antibiotic        History of Present Illness   Nicole Thomas presents to the clinic c/o  54-year-old female comes with cough that started Monday with sore throat.  She has some postnasal drip.  Now she is saying that the cough is in her chest and she feels tight.  Did not sleep well last night due to cough.  No fever or chills.  Some fatigue.    Seen at [facility] urgent care 2024 for similar illness.  At that time it was discovered her mom who is in nursing facility had pneumonia.  Patient was prescribed doxycycline which she did not take.  Also prescribed prednisone and Ventolin inhaler.  She took the prednisone and that helped.  She says that typically she would do nebulizer with albuterol and budesonide but she does not have any or what she has is .    History of  asthma that only seems to flareup when she gets respiratory illnesses.        Review of Systems   Review of Systems   Constitutional:  Positive for activity change and fatigue. Negative for appetite change, chills, diaphoresis and fever.   HENT:  Positive for congestion and postnasal drip. Negative for ear discharge, ear pain, rhinorrhea, sinus pressure, sinus pain and sore throat.    Eyes: Negative.    Respiratory:  Positive for cough, chest tightness and wheezing. Negative for shortness of breath.    Cardiovascular: Negative.    Hematological: Negative.        Current Medications     Long-Term Medications   Medication Sig Dispense Refill    budesonide (Pulmicort) 0.5 mg/2 mL nebulizer solution Take 2 mL (0.5 mg total) by nebulization 2 (two) times a day Rinse mouth after use. 120 mL 0    buPROPion (WELLBUTRIN SR) 150 mg 12 hr tablet Take 150 mg by mouth 2 (two) times a day      celecoxib (CeleBREX) 200 mg capsule       Cholecalciferol 125 MCG (5000 UT) TABS Take 5,000 Units by mouth      clonazePAM (KlonoPIN) 2 mg tablet TAKE 1 TABLET (2 MG TOTAL) BY MOUTH NIGHTLY.  0    cloNIDine (CATAPRES) 0.1 mg tablet Take 1 tablet (0.1 mg total) by mouth every 12 (twelve) hours 180 tablet 3    DULoxetine (CYMBALTA) 30 mg delayed release capsule       lisinopril (ZESTRIL) 5 mg tablet Take 5 mg by mouth daily      methocarbamol (ROBAXIN) 500 mg tablet       nystatin-triamcinolone (MYCOLOG-II) ointment Apply topically 2 (two) times a day 30 g 0    pantoprazole (PROTONIX) 40 mg tablet TAKE 1 TABLET (40 MG TOTAL) BY MOUTH 2 (TWO) TIMES A DAY.      zolpidem (AMBIEN CR) 12.5 MG CR tablet TAKE 1 TABLET BY MOUTH AT BEDTIME         Current Allergies     Allergies as of 12/13/2024 - Reviewed 12/13/2024   Allergen Reaction Noted    Hydrocodone  10/21/2016    Ciprofloxacin  10/26/2012    Codeine  05/06/2015    Erythromycin  05/06/2015    Iodine - food allergy Other (See Comments) 04/30/2007    Iv dye  [iodinated contrast media]   12/11/2013    Povidone iodine  10/26/2012    Penicillins Rash and Swelling 04/30/2007          The following portions of the patient's history were reviewed and updated as appropriate: allergies, current medications, past family history, past medical history, past social history, past surgical history and problem list.  Past Medical History:   Diagnosis Date    Hx of skin cancer, basal cell      Past Surgical History:   Procedure Laterality Date    BREAST BIOPSY Right 10/26/2012    benign    TOE SURGERY  2020    tops of both 4th toes     Family History   Problem Relation Age of Onset    No Known Problems Mother     No Known Problems Father     No Known Problems Maternal Grandmother     No Known Problems Maternal Grandfather     Breast cancer Paternal Grandmother         age unknown    No Known Problems Paternal Grandfather     No Known Problems Brother     No Known Problems Brother     No Known Problems Son     No Known Problems Maternal Aunt     Skin cancer Maternal Aunt         age unknown    Cancer Paternal Uncle         mass in chest    Colon cancer Neg Hx     Endometrial cancer Neg Hx     Ovarian cancer Neg Hx        Objective   /74   Pulse 82   Temp 97.6 °F (36.4 °C) (Tympanic)   Resp 19   LMP 10/02/2018   SpO2 97%   Patient's last menstrual period was 10/02/2018.       Physical Exam     Physical Exam  Vitals and nursing note reviewed.   Constitutional:       General: She is not in acute distress.     Appearance: She is well-developed. She is ill-appearing. She is not toxic-appearing or diaphoretic.      Comments: Appears mildly ill but in no acute distress.  No trismus or conversational dyspnea.   HENT:      Head: Normocephalic and atraumatic.      Right Ear: Tympanic membrane, ear canal and external ear normal.      Left Ear: Tympanic membrane, ear canal and external ear normal.      Nose: Congestion present. No rhinorrhea.      Mouth/Throat:      Mouth: Mucous membranes are moist.      Pharynx:  No oropharyngeal exudate or posterior oropharyngeal erythema.      Comments: Cobblestoning posterior pharynx   Eyes:      General:         Right eye: No discharge.         Left eye: No discharge.      Conjunctiva/sclera: Conjunctivae normal.      Pupils: Pupils are equal, round, and reactive to light.   Cardiovascular:      Rate and Rhythm: Normal rate and regular rhythm.      Heart sounds: Normal heart sounds. No murmur heard.     No friction rub. No gallop.   Pulmonary:      Effort: Pulmonary effort is normal. No respiratory distress.      Breath sounds: Normal breath sounds. No stridor. No wheezing, rhonchi or rales.      Comments: Cough with deep breaths  Musculoskeletal:      Cervical back: Normal range of motion and neck supple. No rigidity or tenderness.   Lymphadenopathy:      Cervical: No cervical adenopathy.   Skin:     General: Skin is warm and dry.      Coloration: Skin is not jaundiced or pale.      Findings: No rash.   Neurological:      General: No focal deficit present.      Mental Status: She is alert and oriented to person, place, and time.   Psychiatric:         Mood and Affect: Mood normal.         Behavior: Behavior normal.

## 2024-12-13 NOTE — PATIENT INSTRUCTIONS
Prescription sent for albuterol and budesonide nebulizer treatment.  Budesonide twice a day and nebulizer.  Albuterol nebulizer every 4-6 hours as needed.    Take prednisone as instructed.  While on prednisone do not take any ibuprofen or ibuprofen like products.  May safely take Tylenol if needed.    Follow-up with primary care as needed.

## 2025-01-04 DIAGNOSIS — J20.9 ACUTE BRONCHITIS, UNSPECIFIED ORGANISM: ICD-10-CM

## 2025-01-07 RX ORDER — BUDESONIDE 0.5 MG/2ML
INHALANT ORAL
Qty: 360 ML | Refills: 1 | Status: SHIPPED | OUTPATIENT
Start: 2025-01-07

## 2025-05-30 NOTE — PROGRESS NOTES
Name: Nicole Thomas      : 1970      MRN: [id number]  Encounter Provider: MALENA Ward  Encounter Date: 2025   Encounter department: [facility]  :  Assessment & Plan  Well woman exam with routine gynecological exam  Encouraged healthy diet, exercise and lifestyle  Encouraged follow-up with PCP and specialists as needed  Orders:    Liquid-based pap, screening    Menopause  Currently taking clonidine 0.1 mg twice a day.  Blood pressures have been low is meeting with cardiologist.  Is going to wean off of clonidine.  Reviewed with patient take it once a day to see how symptoms are and blood pressure response.  Can also take half of the tablet.  Patient will keep symptom log.  If blood pressure remains low and she needs to wean off completely and menopausal symptoms return will call office for appointment to discuss other options         Will call/ [employer] message  with results  VBI-      BMI Counseling: Body mass index is 27.29 kg/m². The BMI is above normal. Nutrition recommendations include 3-5 servings of fruits/vegetables daily. Exercise recommendations include exercising 3-5 times per week. Continue follow up with weight management as scheduled        RTO 1 year for annual exam   History of Present Illness   HPI  Nicole Thoams is a 55 y.o. female who presents  for annual exam.  Last Pap smear 24 NILM.  Last mammogram 24 Birads 2; diagnostic mammogram and US 10/22/24 of right breast birads 1.  CRC screening cascade  at [facility].  The patient has no complaints today. The patient is sexually active.  The patient is not taking hormone replacement therapy.  Using clonidine twice a day for hot flashes. patient denies post-menopausal vaginal bleeding.The patient wears seatbelts: yes. The patient participates in regular exercise: no. The patient reports that there is not domestic violence in her life    History obtained from: patient    Review of Systems    Constitutional:  Negative for chills and fever.   Respiratory: Negative.     Cardiovascular: Negative.      Medical History Reviewed by provider this encounter:  Tobacco  Allergies  Meds  Problems  Med Hx  Surg Hx  Fam Hx  Soc   Hx    .     Objective   /74   Wt 72.1 kg (159 lb)   LMP 10/02/2018   BMI 27.29 kg/m²      Physical Exam  General:   alert and oriented, in no acute distress   Heart: regular rate and rhythm, S1, S2 normal, no murmur, click, rub or gallop   Lungs: clear to auscultation bilaterally   Abdomen: soft, non-tender, without masses or organomegaly   Vulva: normal, Bartholin's, Urethra, McRoberts's normal   Vagina: normal mucosa, normal discharge, no palpable nodules   Cervix: no cervical motion tenderness and no lesions   Uterus: normal size, non-tender, normal shape and consistency   Adnexa: normal adnexa and no mass, fullness, tenderness   Breast:  breasts appear normal, no suspicious masses, no skin or nipple changes or axillary nodes.      Menstrual History:  OB History          2    Para   1    Term   0       1    AB   1    Living   1         SAB   1    IAB        Ectopic        Multiple        Live Births   1           Obstetric Comments   Menarche- early teens               Menarche age: early teens   Patient's last menstrual period was 10/02/2018.

## 2025-06-02 ENCOUNTER — ANNUAL EXAM (OUTPATIENT)
Dept: OBGYN CLINIC | Facility: CLINIC | Age: 55
End: 2025-06-02
Payer: COMMERCIAL

## 2025-06-02 VITALS — BODY MASS INDEX: 27.29 KG/M2 | SYSTOLIC BLOOD PRESSURE: 118 MMHG | DIASTOLIC BLOOD PRESSURE: 74 MMHG | WEIGHT: 159 LBS

## 2025-06-02 DIAGNOSIS — Z01.419 WELL WOMAN EXAM WITH ROUTINE GYNECOLOGICAL EXAM: Primary | ICD-10-CM

## 2025-06-02 DIAGNOSIS — Z78.0 MENOPAUSE: ICD-10-CM

## 2025-06-02 PROBLEM — I25.10 CORONARY ARTERY CALCIFICATION: Status: ACTIVE | Noted: 2023-11-06

## 2025-06-02 PROBLEM — J45.909 ASTHMA: Status: ACTIVE | Noted: 2025-06-02

## 2025-06-02 PROBLEM — F60.3 BORDERLINE PERSONALITY DISORDER (HCC): Status: ACTIVE | Noted: 2020-07-16

## 2025-06-02 PROBLEM — E78.2 MIXED HYPERLIPIDEMIA: Status: ACTIVE | Noted: 2023-11-06

## 2025-06-02 PROBLEM — I10 PRIMARY HYPERTENSION: Status: ACTIVE | Noted: 2023-11-06

## 2025-06-02 PROCEDURE — G0476 HPV COMBO ASSAY CA SCREEN: HCPCS | Performed by: NURSE PRACTITIONER

## 2025-06-02 PROCEDURE — S0612 ANNUAL GYNECOLOGICAL EXAMINA: HCPCS | Performed by: NURSE PRACTITIONER

## 2025-06-02 PROCEDURE — G0145 SCR C/V CYTO,THINLAYER,RESCR: HCPCS | Performed by: NURSE PRACTITIONER

## 2025-06-02 RX ORDER — TIRZEPATIDE 12.5 MG/.5ML
12.5 INJECTION, SOLUTION SUBCUTANEOUS WEEKLY
COMMUNITY
Start: 2025-05-09

## 2025-06-02 RX ORDER — TIRZEPATIDE 7.5 MG/.5ML
INJECTION, SOLUTION SUBCUTANEOUS
COMMUNITY
Start: 2025-03-14 | End: 2025-06-02

## 2025-06-02 RX ORDER — TIRZEPATIDE 10 MG/.5ML
10 INJECTION, SOLUTION SUBCUTANEOUS WEEKLY
COMMUNITY
Start: 2025-04-15 | End: 2025-06-02

## 2025-06-02 RX ORDER — KETOROLAC TROMETHAMINE 10 MG/1
10 TABLET, FILM COATED ORAL EVERY 6 HOURS
COMMUNITY
Start: 2025-05-24 | End: 2025-06-02

## 2025-06-02 RX ORDER — VALACYCLOVIR HYDROCHLORIDE 1 G/1
TABLET, FILM COATED ORAL
COMMUNITY
Start: 2025-03-05

## 2025-06-06 ENCOUNTER — RESULTS FOLLOW-UP (OUTPATIENT)
Dept: OBGYN CLINIC | Facility: MEDICAL CENTER | Age: 55
End: 2025-06-06

## 2025-06-06 LAB
LAB AP GYN PRIMARY INTERPRETATION: NORMAL
Lab: NORMAL

## 2025-06-09 ENCOUNTER — HOSPITAL ENCOUNTER (OUTPATIENT)
Dept: MAMMOGRAPHY | Facility: MEDICAL CENTER | Age: 55
Discharge: HOME | End: 2025-06-09
Attending: OBSTETRICS & GYNECOLOGY
Payer: COMMERCIAL

## 2025-06-09 VITALS — BODY MASS INDEX: 27.14 KG/M2 | HEIGHT: 64 IN | WEIGHT: 158.95 LBS

## 2025-06-09 DIAGNOSIS — Z12.31 ENCOUNTER FOR SCREENING MAMMOGRAM FOR BREAST CANCER: ICD-10-CM

## 2025-06-09 PROCEDURE — 77063 BREAST TOMOSYNTHESIS BI: CPT

## 2025-06-09 PROCEDURE — 77067 SCR MAMMO BI INCL CAD: CPT

## 2025-06-16 ENCOUNTER — RESULTS FOLLOW-UP (OUTPATIENT)
Dept: OBGYN CLINIC | Facility: MEDICAL CENTER | Age: 55
End: 2025-06-16

## 2025-08-21 ENCOUNTER — OFFICE VISIT (OUTPATIENT)
Dept: OBGYN CLINIC | Facility: MEDICAL CENTER | Age: 55
End: 2025-08-21
Payer: COMMERCIAL

## 2025-08-21 VITALS
DIASTOLIC BLOOD PRESSURE: 72 MMHG | SYSTOLIC BLOOD PRESSURE: 106 MMHG | HEIGHT: 64 IN | WEIGHT: 157.8 LBS | BODY MASS INDEX: 26.94 KG/M2

## 2025-08-21 DIAGNOSIS — N95.1 MENOPAUSE SYNDROME: Primary | ICD-10-CM

## 2025-08-21 PROCEDURE — 99213 OFFICE O/P EST LOW 20 MIN: CPT | Performed by: NURSE PRACTITIONER

## 2025-08-21 RX ORDER — BIMATOPROST 0.1 MG/ML
1 SOLUTION/ DROPS OPHTHALMIC
COMMUNITY

## 2025-08-21 RX ORDER — SEMAGLUTIDE 2.4 MG/.75ML
INJECTION, SOLUTION SUBCUTANEOUS
COMMUNITY
Start: 2025-07-31

## 2025-08-21 RX ORDER — ESTRADIOL/NORETHINDRONE ACETATE TRANSDERMAL SYSTEM .05; .14 MG/D; MG/D
1 PATCH, EXTENDED RELEASE TRANSDERMAL 2 TIMES WEEKLY
Qty: 8 PATCH | Refills: 3 | Status: SHIPPED | OUTPATIENT
Start: 2025-08-21